# Patient Record
Sex: MALE | Race: WHITE | Employment: OTHER | URBAN - NONMETROPOLITAN AREA
[De-identification: names, ages, dates, MRNs, and addresses within clinical notes are randomized per-mention and may not be internally consistent; named-entity substitution may affect disease eponyms.]

---

## 2018-03-26 ENCOUNTER — NURSE TRIAGE (OUTPATIENT)
Dept: ADMINISTRATIVE | Age: 43
End: 2018-03-26

## 2018-10-12 ENCOUNTER — APPOINTMENT (OUTPATIENT)
Dept: CT IMAGING | Age: 43
DRG: 720 | End: 2018-10-12
Payer: MEDICARE

## 2018-10-12 ENCOUNTER — HOSPITAL ENCOUNTER (INPATIENT)
Age: 43
LOS: 8 days | Discharge: HOME | DRG: 720 | End: 2018-10-20
Attending: INTERNAL MEDICINE
Payer: MEDICARE

## 2018-10-12 ENCOUNTER — APPOINTMENT (OUTPATIENT)
Dept: GENERAL RADIOLOGY | Age: 43
DRG: 720 | End: 2018-10-12
Payer: MEDICARE

## 2018-10-12 DIAGNOSIS — K85.90 ACUTE PANCREATITIS, UNSPECIFIED COMPLICATION STATUS, UNSPECIFIED PANCREATITIS TYPE: Primary | ICD-10-CM

## 2018-10-12 DIAGNOSIS — S09.90XA TRAUMATIC INJURY OF HEAD, INITIAL ENCOUNTER: ICD-10-CM

## 2018-10-12 PROBLEM — S06.0XAA CLOSED HEAD INJURY WITH CONCUSSION: Status: ACTIVE | Noted: 2018-10-12

## 2018-10-12 PROBLEM — S01.01XA LACERATION OF OCCIPITAL SCALP: Status: ACTIVE | Noted: 2018-10-12

## 2018-10-12 PROBLEM — R56.9 OBSERVED SEIZURE-LIKE ACTIVITY (HCC): Status: ACTIVE | Noted: 2018-10-12

## 2018-10-12 PROBLEM — S01.00XA UNSPECIFIED OPEN WOUND OF SCALP, INITIAL ENCOUNTER: Status: ACTIVE | Noted: 2018-10-12

## 2018-10-12 PROBLEM — R41.0 DISORIENTATION: Status: ACTIVE | Noted: 2018-10-12
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
## 2018-10-12 LAB
ALBUMIN SERPL-MCNC: 3.8 G/DL (ref 3.5–5.1)
ALLEN TEST: ABNORMAL
ALLEN TEST: POSITIVE
ALP BLD-CCNC: 71 U/L (ref 38–126)
ALT SERPL-CCNC: 58 U/L (ref 11–66)
AMPHETAMINE+METHAMPHETAMINE URINE SCREEN: NEGATIVE
ANION GAP SERPL CALCULATED.3IONS-SCNC: 18 MEQ/L (ref 8–16)
AST SERPL-CCNC: 69 U/L (ref 5–40)
BACTERIA: ABNORMAL /HPF
BARBITURATE QUANTITATIVE URINE: NEGATIVE
BASE EXCESS (CALCULATED): -18.4 MMOL/L (ref -2.5–2.5)
BASE EXCESS (CALCULATED): -7 MMOL/L (ref -2.5–2.5)
BASOPHILS # BLD: 0.3 %
BASOPHILS ABSOLUTE: 0 THOU/MM3 (ref 0–0.1)
BENZODIAZEPINE QUANTITATIVE URINE: NEGATIVE
BILIRUB SERPL-MCNC: 0.7 MG/DL (ref 0.3–1.2)
BILIRUBIN DIRECT: < 0.2 MG/DL (ref 0–0.3)
BILIRUBIN URINE: NEGATIVE
BLOOD, URINE: ABNORMAL
BUN BLDV-MCNC: 10 MG/DL (ref 7–22)
CALCIUM SERPL-MCNC: 8.2 MG/DL (ref 8.5–10.5)
CANNABINOID QUANTITATIVE URINE: NEGATIVE
CASTS 2: ABNORMAL /LPF
CASTS UA: ABNORMAL /LPF
CHARACTER, URINE: CLEAR
CHLORIDE BLD-SCNC: 99 MEQ/L (ref 98–111)
CO2: 19 MEQ/L (ref 23–33)
COCAINE METABOLITE QUANTITATIVE URINE: NEGATIVE
COLLECTED BY:: ABNORMAL
COLLECTED BY:: ABNORMAL
COLOR: YELLOW
CREAT SERPL-MCNC: 0.7 MG/DL (ref 0.4–1.2)
CRYSTALS, UA: ABNORMAL
DEVICE: ABNORMAL
DEVICE: ABNORMAL
EKG ATRIAL RATE: 142 BPM
EKG P AXIS: 88 DEGREES
EKG P-R INTERVAL: 128 MS
EKG Q-T INTERVAL: 290 MS
EKG QRS DURATION: 100 MS
EKG QTC CALCULATION (BAZETT): 446 MS
EKG R AXIS: 110 DEGREES
EKG T AXIS: 72 DEGREES
EKG VENTRICULAR RATE: 142 BPM
EOSINOPHIL # BLD: 0 %
EOSINOPHILS ABSOLUTE: 0 THOU/MM3 (ref 0–0.4)
EPITHELIAL CELLS, UA: ABNORMAL /HPF
ERYTHROCYTE [DISTWIDTH] IN BLOOD BY AUTOMATED COUNT: 13.6 % (ref 11.5–14.5)
ERYTHROCYTE [DISTWIDTH] IN BLOOD BY AUTOMATED COUNT: 52.7 FL (ref 35–45)
ETHYL ALCOHOL, SERUM: < 0.01 %
GFR SERPL CREATININE-BSD FRML MDRD: > 90 ML/MIN/1.73M2
GLUCOSE BLD-MCNC: 98 MG/DL (ref 70–108)
GLUCOSE URINE: 100 MG/DL
HCO3: 18 MMOL/L (ref 23–28)
HCO3: 9 MMOL/L (ref 23–28)
HCT VFR BLD CALC: 46.6 % (ref 42–52)
HEMOGLOBIN: 15.4 GM/DL (ref 14–18)
IMMATURE GRANS (ABS): 0.15 THOU/MM3 (ref 0–0.07)
IMMATURE GRANULOCYTES: 1 %
INR BLD: 0.84 (ref 0.85–1.13)
KETONES, URINE: NEGATIVE
LEUKOCYTE ESTERASE, URINE: NEGATIVE
LIPASE: > 600 U/L (ref 5.6–51.3)
LYMPHOCYTES # BLD: 13.9 %
LYMPHOCYTES ABSOLUTE: 2 THOU/MM3 (ref 1–4.8)
MCH RBC QN AUTO: 34.3 PG (ref 26–33)
MCHC RBC AUTO-ENTMCNC: 33 GM/DL (ref 32.2–35.5)
MCV RBC AUTO: 103.8 FL (ref 80–94)
MISCELLANEOUS 2: ABNORMAL
MISCELLANEOUS LAB TEST RESULT: ABNORMAL
MONOCYTES # BLD: 8 %
MONOCYTES ABSOLUTE: 1.1 THOU/MM3 (ref 0.4–1.3)
MUCUS: ABNORMAL
NITRITE, URINE: NEGATIVE
NUCLEATED RED BLOOD CELLS: 0 /100 WBC
O2 SATURATION: 80 %
O2 SATURATION: 94 %
OPIATES, URINE: NEGATIVE
OSMOLALITY CALCULATION: 271 MOSMOL/KG (ref 275–300)
OXYCODONE: NEGATIVE
PCO2: 27 MMHG (ref 35–45)
PCO2: 34 MMHG (ref 35–45)
PH BLOOD GAS: 7.14 (ref 7.35–7.45)
PH BLOOD GAS: 7.34 (ref 7.35–7.45)
PH UA: 5.5
PHENCYCLIDINE QUANTITATIVE URINE: NEGATIVE
PLATELET # BLD: 120 THOU/MM3 (ref 130–400)
PMV BLD AUTO: 10.9 FL (ref 9.4–12.4)
PO2: 56 MMHG (ref 71–104)
PO2: 75 MMHG (ref 71–104)
POTASSIUM SERPL-SCNC: 4.1 MEQ/L (ref 3.5–5.2)
PROLACTIN: 14.1 NG/ML
PROTEIN UA: 100
RBC # BLD: 4.49 MILL/MM3 (ref 4.7–6.1)
RBC URINE: ABNORMAL /HPF
RENAL EPITHELIAL, UA: ABNORMAL
SEG NEUTROPHILS: 76.8 %
SEGMENTED NEUTROPHILS ABSOLUTE COUNT: 11 THOU/MM3 (ref 1.8–7.7)
SODIUM BLD-SCNC: 136 MEQ/L (ref 135–145)
SOURCE, BLOOD GAS: ABNORMAL
SOURCE, BLOOD GAS: ABNORMAL
SPECIFIC GRAVITY, URINE: > 1.03 (ref 1–1.03)
TOTAL PROTEIN: 5.8 G/DL (ref 6.1–8)
TROPONIN T: < 0.01 NG/ML
UROBILINOGEN, URINE: 0.2 EU/DL
WBC # BLD: 14.3 THOU/MM3 (ref 4.8–10.8)
WBC UA: ABNORMAL /HPF
YEAST: ABNORMAL

## 2018-10-12 PROCEDURE — 85025 COMPLETE CBC W/AUTO DIFF WBC: CPT

## 2018-10-12 PROCEDURE — 99222 1ST HOSP IP/OBS MODERATE 55: CPT | Performed by: PSYCHIATRY & NEUROLOGY

## 2018-10-12 PROCEDURE — 82803 BLOOD GASES ANY COMBINATION: CPT

## 2018-10-12 PROCEDURE — 6820000002 HC L2 INJURY CALL ACTIVATION

## 2018-10-12 PROCEDURE — 71275 CT ANGIOGRAPHY CHEST: CPT

## 2018-10-12 PROCEDURE — 2709999900 HC NON-CHARGEABLE SUPPLY

## 2018-10-12 PROCEDURE — 3209999900

## 2018-10-12 PROCEDURE — 71045 X-RAY EXAM CHEST 1 VIEW: CPT

## 2018-10-12 PROCEDURE — 2060000000 HC ICU INTERMEDIATE R&B

## 2018-10-12 PROCEDURE — 2580000003 HC RX 258: Performed by: INTERNAL MEDICINE

## 2018-10-12 PROCEDURE — 36415 COLL VENOUS BLD VENIPUNCTURE: CPT

## 2018-10-12 PROCEDURE — 72125 CT NECK SPINE W/O DYE: CPT

## 2018-10-12 PROCEDURE — 99285 EMERGENCY DEPT VISIT HI MDM: CPT

## 2018-10-12 PROCEDURE — 80307 DRUG TEST PRSMV CHEM ANLYZR: CPT

## 2018-10-12 PROCEDURE — 6360000004 HC RX CONTRAST MEDICATION: Performed by: INTERNAL MEDICINE

## 2018-10-12 PROCEDURE — 36600 WITHDRAWAL OF ARTERIAL BLOOD: CPT

## 2018-10-12 PROCEDURE — 74177 CT ABD & PELVIS W/CONTRAST: CPT

## 2018-10-12 PROCEDURE — 70450 CT HEAD/BRAIN W/O DYE: CPT

## 2018-10-12 PROCEDURE — 85610 PROTHROMBIN TIME: CPT

## 2018-10-12 PROCEDURE — 6370000000 HC RX 637 (ALT 250 FOR IP): Performed by: PSYCHIATRY & NEUROLOGY

## 2018-10-12 PROCEDURE — 76376 3D RENDER W/INTRP POSTPROCES: CPT

## 2018-10-12 PROCEDURE — 76705 ECHO EXAM OF ABDOMEN: CPT

## 2018-10-12 PROCEDURE — 87040 BLOOD CULTURE FOR BACTERIA: CPT

## 2018-10-12 PROCEDURE — 80053 COMPREHEN METABOLIC PANEL: CPT

## 2018-10-12 PROCEDURE — 93005 ELECTROCARDIOGRAM TRACING: CPT | Performed by: INTERNAL MEDICINE

## 2018-10-12 PROCEDURE — 84146 ASSAY OF PROLACTIN: CPT

## 2018-10-12 PROCEDURE — 81001 URINALYSIS AUTO W/SCOPE: CPT

## 2018-10-12 PROCEDURE — L0150 CERV SEMI-RIG ADJ MOLDED CHN: HCPCS

## 2018-10-12 PROCEDURE — 99253 IP/OBS CNSLTJ NEW/EST LOW 45: CPT | Performed by: SURGERY

## 2018-10-12 PROCEDURE — 84484 ASSAY OF TROPONIN QUANT: CPT

## 2018-10-12 PROCEDURE — 87086 URINE CULTURE/COLONY COUNT: CPT

## 2018-10-12 PROCEDURE — G0480 DRUG TEST DEF 1-7 CLASSES: HCPCS

## 2018-10-12 PROCEDURE — 82248 BILIRUBIN DIRECT: CPT

## 2018-10-12 PROCEDURE — 6360000002 HC RX W HCPCS: Performed by: PSYCHIATRY & NEUROLOGY

## 2018-10-12 PROCEDURE — APPSS180 APP SPLIT SHARED TIME > 60 MINUTES: Performed by: PHYSICIAN ASSISTANT

## 2018-10-12 PROCEDURE — 83690 ASSAY OF LIPASE: CPT

## 2018-10-12 PROCEDURE — 94760 N-INVAS EAR/PLS OXIMETRY 1: CPT

## 2018-10-12 RX ORDER — ONDANSETRON 2 MG/ML
4 INJECTION INTRAMUSCULAR; INTRAVENOUS EVERY 6 HOURS PRN
Status: DISCONTINUED | OUTPATIENT
Start: 2018-10-12 | End: 2018-10-12 | Stop reason: SDUPTHER

## 2018-10-12 RX ORDER — SODIUM CHLORIDE 0.9 % (FLUSH) 0.9 %
10 SYRINGE (ML) INJECTION PRN
Status: DISCONTINUED | OUTPATIENT
Start: 2018-10-12 | End: 2018-10-12 | Stop reason: SDUPTHER

## 2018-10-12 RX ORDER — POTASSIUM CHLORIDE 7.45 MG/ML
10 INJECTION INTRAVENOUS PRN
Status: DISCONTINUED | OUTPATIENT
Start: 2018-10-12 | End: 2018-10-20 | Stop reason: HOSPADM

## 2018-10-12 RX ORDER — ONDANSETRON 2 MG/ML
4 INJECTION INTRAMUSCULAR; INTRAVENOUS EVERY 6 HOURS PRN
Status: DISCONTINUED | OUTPATIENT
Start: 2018-10-12 | End: 2018-10-20 | Stop reason: HOSPADM

## 2018-10-12 RX ORDER — ACETAMINOPHEN 325 MG/1
650 TABLET ORAL EVERY 4 HOURS PRN
Status: DISCONTINUED | OUTPATIENT
Start: 2018-10-12 | End: 2018-10-20 | Stop reason: HOSPADM

## 2018-10-12 RX ORDER — SODIUM CHLORIDE 0.9 % (FLUSH) 0.9 %
10 SYRINGE (ML) INJECTION EVERY 12 HOURS SCHEDULED
Status: DISCONTINUED | OUTPATIENT
Start: 2018-10-12 | End: 2018-10-20 | Stop reason: HOSPADM

## 2018-10-12 RX ORDER — THIAMINE MONONITRATE (VIT B1) 100 MG
100 TABLET ORAL DAILY
Status: DISCONTINUED | OUTPATIENT
Start: 2018-10-12 | End: 2018-10-14

## 2018-10-12 RX ORDER — LORAZEPAM 2 MG/1
2 TABLET ORAL
Status: DISCONTINUED | OUTPATIENT
Start: 2018-10-12 | End: 2018-10-20 | Stop reason: HOSPADM

## 2018-10-12 RX ORDER — SODIUM CHLORIDE 0.9 % (FLUSH) 0.9 %
10 SYRINGE (ML) INJECTION PRN
Status: DISCONTINUED | OUTPATIENT
Start: 2018-10-12 | End: 2018-10-20 | Stop reason: HOSPADM

## 2018-10-12 RX ORDER — POTASSIUM CHLORIDE 20 MEQ/1
40 TABLET, EXTENDED RELEASE ORAL PRN
Status: DISCONTINUED | OUTPATIENT
Start: 2018-10-12 | End: 2018-10-20 | Stop reason: HOSPADM

## 2018-10-12 RX ORDER — SODIUM CHLORIDE 0.9 % (FLUSH) 0.9 %
10 SYRINGE (ML) INJECTION EVERY 12 HOURS SCHEDULED
Status: DISCONTINUED | OUTPATIENT
Start: 2018-10-12 | End: 2018-10-12 | Stop reason: SDUPTHER

## 2018-10-12 RX ORDER — PANTOPRAZOLE SODIUM 40 MG/1
40 TABLET, DELAYED RELEASE ORAL
Status: DISCONTINUED | OUTPATIENT
Start: 2018-10-13 | End: 2018-10-20 | Stop reason: HOSPADM

## 2018-10-12 RX ORDER — LORAZEPAM 2 MG/1
4 TABLET ORAL
Status: DISCONTINUED | OUTPATIENT
Start: 2018-10-12 | End: 2018-10-20 | Stop reason: HOSPADM

## 2018-10-12 RX ORDER — FOLIC ACID 1 MG/1
1 TABLET ORAL DAILY
Status: DISCONTINUED | OUTPATIENT
Start: 2018-10-12 | End: 2018-10-14

## 2018-10-12 RX ORDER — LORAZEPAM 2 MG/ML
1 INJECTION INTRAMUSCULAR
Status: DISCONTINUED | OUTPATIENT
Start: 2018-10-12 | End: 2018-10-20 | Stop reason: HOSPADM

## 2018-10-12 RX ORDER — LORAZEPAM 2 MG/ML
3 INJECTION INTRAMUSCULAR
Status: DISCONTINUED | OUTPATIENT
Start: 2018-10-12 | End: 2018-10-20 | Stop reason: HOSPADM

## 2018-10-12 RX ORDER — LORAZEPAM 1 MG/1
1 TABLET ORAL
Status: DISCONTINUED | OUTPATIENT
Start: 2018-10-12 | End: 2018-10-20 | Stop reason: HOSPADM

## 2018-10-12 RX ORDER — NICOTINE 21 MG/24HR
1 PATCH, TRANSDERMAL 24 HOURS TRANSDERMAL DAILY
Status: DISCONTINUED | OUTPATIENT
Start: 2018-10-12 | End: 2018-10-20 | Stop reason: HOSPADM

## 2018-10-12 RX ORDER — POTASSIUM CHLORIDE 20MEQ/15ML
40 LIQUID (ML) ORAL PRN
Status: DISCONTINUED | OUTPATIENT
Start: 2018-10-12 | End: 2018-10-20 | Stop reason: HOSPADM

## 2018-10-12 RX ORDER — LORAZEPAM 2 MG/ML
4 INJECTION INTRAMUSCULAR
Status: DISCONTINUED | OUTPATIENT
Start: 2018-10-12 | End: 2018-10-20 | Stop reason: HOSPADM

## 2018-10-12 RX ORDER — LORAZEPAM 2 MG/ML
2 INJECTION INTRAMUSCULAR
Status: DISCONTINUED | OUTPATIENT
Start: 2018-10-12 | End: 2018-10-20 | Stop reason: HOSPADM

## 2018-10-12 RX ORDER — SODIUM CHLORIDE 9 MG/ML
INJECTION, SOLUTION INTRAVENOUS CONTINUOUS
Status: DISCONTINUED | OUTPATIENT
Start: 2018-10-12 | End: 2018-10-13

## 2018-10-12 RX ADMIN — LORAZEPAM 2 MG: 2 INJECTION INTRAMUSCULAR; INTRAVENOUS at 20:46

## 2018-10-12 RX ADMIN — SODIUM CHLORIDE: 9 INJECTION, SOLUTION INTRAVENOUS at 20:45

## 2018-10-12 RX ADMIN — LORAZEPAM 2 MG: 2 INJECTION INTRAMUSCULAR; INTRAVENOUS at 23:20

## 2018-10-12 RX ADMIN — Medication 10 ML: at 22:20

## 2018-10-12 RX ADMIN — SODIUM CHLORIDE 1000 ML: 9 INJECTION, SOLUTION INTRAVENOUS at 13:51

## 2018-10-12 RX ADMIN — IOPAMIDOL 80 ML: 755 INJECTION, SOLUTION INTRAVENOUS at 13:43

## 2018-10-12 RX ADMIN — IOPAMIDOL 80 ML: 755 INJECTION, SOLUTION INTRAVENOUS at 17:11

## 2018-10-12 RX ADMIN — SODIUM CHLORIDE 1000 ML: 9 INJECTION, SOLUTION INTRAVENOUS at 14:36

## 2018-10-12 ASSESSMENT — PAIN SCALES - GENERAL
PAINLEVEL_OUTOF10: 0
PAINLEVEL_OUTOF10: 0

## 2018-10-12 NOTE — CONSULTS
MCV  103.8*   MCH  34.3*   MCHC  33.0     CMP:  Recent Labs      10/12/18   1428   NA  136   K  4.1   CL  99   CO2  19*   BUN  10   CREATININE  0.7   LABGLOM  >90   GLUCOSE  98   CALCIUM  8.2*     Liver: Recent Labs      10/12/18   1428   AST  69*   ALT  58   ALKPHOS  71   PROT  5.8*   LABALBU  3.8   BILITOT  0.7     MRI BRAIN:  No results found for this or any previous visit. No results found for this or any previous visit. No results found for this or any previous visit. No results found for this or any previous visit. No results found for this or any previous visit. No results found for this or any previous visit. No results found for this or any previous visit. Results for orders placed during the hospital encounter of 10/12/18   CT Head WO Contrast    Narrative PROCEDURE: CT HEAD WO CONTRAST    CLINICAL INFORMATION: Headache, trauma. COMPARISON: No prior study. TECHNIQUE: Noncontrast 5 mm axial images were obtained through the brain. All CT scans at this facility use dose modulation, iterative reconstruction, and/or weight-based dosing when appropriate to reduce radiation dose to as low as reasonably achievable. FINDINGS:    No acute intracranial findings. Gray-white differentiation is preserved. No acute hemorrhage or midline shift. Volume loss in the posterior fossa. Ventricles are prominent greater than expected for patient age. Correlation advised. Paranasal sinuses mild mucosal thickening. Mastoid air cells are patent. Skull: Unremarkable. Soft tissues: Posterior scalp hematoma. Impression No acute intracranial findings. **This report has been created using voice recognition software. It may contain minor errors which are inherent in voice recognition technology. **    Final report electronically signed by Dr. Lai Morales on 10/12/2018 2:17 PM         We reviewed the patient records and available information in the EHR       Impression:    Active Problems:    Fall down stairs    Seizure-like activity (HCC)    Disorientation    Unspecified open wound of scalp, initial encounter    Acute pancreatitis without infection or necrosis    Closed head injury with concussion    Acute pancreatitis  Resolved Problems:    * No resolved hospital problems. *    39 year old man with alcohol related seizure disorder due to excessive alcohol consumption. Recommendations:  - will not start seizure med at this time, as seizure med will not work well in such an alcohol abuse setting  - secondly patient does not want to take it and will not lead to compliance  - CIWA protocol, ativan PRN if shows sign of withdrawal  - thiamine and folate  - observe pt for EtOH withdrawal  - no further neurological workup at this time. - patient told not to drive and should not be driving. Mom reported that pt is still driving despite having seizure a few months ago. - Pt should also not climb high, operate heavy machinery and swim. 1. Case was discussed with primary service. 2. All questions were answered. It was my pleasure to evaluate Miguel Puentes today. Please call with questions.       Electronically signed by Shahram Roger MD on 10/12/2018 at 5:42 PM

## 2018-10-12 NOTE — ED PROVIDER NOTES
[facility]  eMERGENCY dEPARTMENT eNCOUnter          CHIEF COMPLAINT     No chief complaint on file. Nurses Notes reviewed and I agree except as noted in the HPI. HISTORY OF PRESENT ILLNESS    Miguel Julien is a 39 y.o. male who presents to the Emergency Department via EMS for the evaluation of a possible seizure. EMS states to have received a call around 1210 today from the patient's sister who stated that the patient had a seizure and needed to be brought to the ED for evaluation. Upon EMS arrival to the patient's residence, they state that the patient was found in the corner of his basement confused and disoriented. Upon arrival to the ED the patient committed a seizure and was transferred to the trauma bay for further evaluation and work up. The HPI was provided by EMS    REVIEW OF SYSTEMS     Review of Systems   Unable to perform ROS: Acuity of condition       PAST MEDICAL HISTORY    has no past medical history on file. SURGICAL HISTORY      has no past surgical history on file. CURRENT MEDICATIONS       There are no discharge medications for this patient. ALLERGIES     has no allergies on file. FAMILY HISTORY     has no family status information on file. family history is not on file. SOCIAL HISTORY          PHYSICAL EXAM     INITIAL VITALS:  temperature is 98.6 °F (37 °C). His blood pressure is 140/86 (abnormal) and his pulse is 105. His respiration is 16 and oxygen saturation is 94%. Physical Exam   Constitutional: He appears well-developed and well-nourished. HENT:   Head: Normocephalic. Right Ear: Tympanic membrane and external ear normal.   Left Ear: Tympanic membrane and external ear normal.   Nose: Nose normal.   Mouth/Throat: Oropharynx is clear and moist and mucous membranes are normal. No oropharyngeal exudate, posterior oropharyngeal edema or posterior oropharyngeal erythema. Puncture wound to the posterior scalp.     Eyes: Conjunctivae and PO2 56 (*)     HCO3 9 (*)     Base Excess (Calculated) -18.4 (*)     All other components within normal limits   HEPATIC FUNCTION PANEL - Abnormal; Notable for the following:     AST 69 (*)     Total Protein 5.8 (*)     All other components within normal limits   BASIC METABOLIC PANEL - Abnormal; Notable for the following:     CO2 19 (*)     Calcium 8.2 (*)     All other components within normal limits   LIPASE - Abnormal; Notable for the following:     Lipase >600.0 (*)     All other components within normal limits   BLOOD GAS, ARTERIAL - Abnormal; Notable for the following:     pH, Blood Gas 7.34 (*)     PCO2 34 (*)     HCO3 18 (*)     Base Excess (Calculated) -7.0 (*)     All other components within normal limits   URINE WITH REFLEXED MICRO - Abnormal; Notable for the following:     Glucose, Ur 100 (*)     Specific Gravity, Urine > 1.030 (*)     Blood, Urine LARGE (*)     Protein,  (*)     All other components within normal limits   ANION GAP - Abnormal; Notable for the following: Anion Gap 18.0 (*)     All other components within normal limits   OSMOLALITY - Abnormal; Notable for the following:     Osmolality Calc 271.0 (*)     All other components within normal limits   CULTURE BLOOD #2   CULTURE BLOOD #1   URINE CULTURE, REFLEXED   TROPONIN   PROLACTIN   ETHANOL   URINE DRUG SCREEN   GLOMERULAR FILTRATION RATE, ESTIMATED       EMERGENCY DEPARTMENT COURSE:   Vitals:    Vitals:    10/12/18 1435 10/12/18 1509 10/12/18 1601 10/12/18 1653   BP: (!) 164/105 (!) 156/95 (!) 139/95 (!) 140/86   Pulse: 113 115 108 105   Resp: 19 18 18 16   Temp:       SpO2: 93% 93% 93% 94%       12:40 PM: The patient was seen and evaluated. 12:47 PM: Upgrade to level 2 trauma due to the patient's presenting condition. 12:55 PM: Trauma MEAGHAN Chavez Child in to evaluate the patient     MDM:  Patient was brought to the emergency department with possible seizure and head trauma.  Patient initial workup was directed towards the for and in the presence of Lauren Wright MD.    Signed by: Casie Piña, 10/12/18 5:30 PM    Provider:  I personally performed the services described in the documentation, reviewed and edited the documentation which was dictated to the scribe in my presence, and it accurately records my words and actions.     Lauren Wright MD 10/12/18 5:30 PM          Lauren Wright MD  10/12/18 [id number]

## 2018-10-13 PROBLEM — E43 SEVERE MALNUTRITION (HCC): Status: ACTIVE | Noted: 2018-10-13

## 2018-10-13 LAB
ANION GAP SERPL CALCULATED.3IONS-SCNC: 14 MEQ/L (ref 8–16)
BUN BLDV-MCNC: 5 MG/DL (ref 7–22)
CALCIUM SERPL-MCNC: 8.1 MG/DL (ref 8.5–10.5)
CHLORIDE BLD-SCNC: 100 MEQ/L (ref 98–111)
CO2: 22 MEQ/L (ref 23–33)
CREAT SERPL-MCNC: 0.3 MG/DL (ref 0.4–1.2)
ERYTHROCYTE [DISTWIDTH] IN BLOOD BY AUTOMATED COUNT: 13.3 % (ref 11.5–14.5)
ERYTHROCYTE [DISTWIDTH] IN BLOOD BY AUTOMATED COUNT: 46.1 FL (ref 35–45)
FOLATE: 7.9 NG/ML (ref 4.8–24.2)
GFR SERPL CREATININE-BSD FRML MDRD: > 90 ML/MIN/1.73M2
GLUCOSE BLD-MCNC: 80 MG/DL (ref 70–108)
HCT VFR BLD CALC: 34.7 % (ref 42–52)
HEMOGLOBIN: 12.5 GM/DL (ref 14–18)
LIPASE: 239.7 U/L (ref 5.6–51.3)
MAGNESIUM: 1.8 MG/DL (ref 1.6–2.4)
MCH RBC QN AUTO: 34.2 PG (ref 26–33)
MCHC RBC AUTO-ENTMCNC: 36 GM/DL (ref 32.2–35.5)
MCV RBC AUTO: 94.8 FL (ref 80–94)
ORGANISM: ABNORMAL
PLATELET # BLD: 80 THOU/MM3 (ref 130–400)
PMV BLD AUTO: 10.8 FL (ref 9.4–12.4)
POTASSIUM REFLEX MAGNESIUM: 2.8 MEQ/L (ref 3.5–5.2)
POTASSIUM SERPL-SCNC: 2.9 MEQ/L (ref 3.5–5.2)
POTASSIUM SERPL-SCNC: 3.5 MEQ/L (ref 3.5–5.2)
RBC # BLD: 3.66 MILL/MM3 (ref 4.7–6.1)
SODIUM BLD-SCNC: 136 MEQ/L (ref 135–145)
URINE CULTURE REFLEX: ABNORMAL
VITAMIN B-12: 330 PG/ML (ref 211–911)
WBC # BLD: 10.6 THOU/MM3 (ref 4.8–10.8)

## 2018-10-13 PROCEDURE — 83690 ASSAY OF LIPASE: CPT

## 2018-10-13 PROCEDURE — 85027 COMPLETE CBC AUTOMATED: CPT

## 2018-10-13 PROCEDURE — 2060000000 HC ICU INTERMEDIATE R&B

## 2018-10-13 PROCEDURE — 80048 BASIC METABOLIC PNL TOTAL CA: CPT

## 2018-10-13 PROCEDURE — G8978 MOBILITY CURRENT STATUS: HCPCS

## 2018-10-13 PROCEDURE — 83735 ASSAY OF MAGNESIUM: CPT

## 2018-10-13 PROCEDURE — 97530 THERAPEUTIC ACTIVITIES: CPT

## 2018-10-13 PROCEDURE — 2709999900 HC NON-CHARGEABLE SUPPLY

## 2018-10-13 PROCEDURE — 99233 SBSQ HOSP IP/OBS HIGH 50: CPT | Performed by: INTERNAL MEDICINE

## 2018-10-13 PROCEDURE — 97161 PT EVAL LOW COMPLEX 20 MIN: CPT

## 2018-10-13 PROCEDURE — 6370000000 HC RX 637 (ALT 250 FOR IP): Performed by: PSYCHIATRY & NEUROLOGY

## 2018-10-13 PROCEDURE — 2580000003 HC RX 258: Performed by: INTERNAL MEDICINE

## 2018-10-13 PROCEDURE — 6360000002 HC RX W HCPCS: Performed by: INTERNAL MEDICINE

## 2018-10-13 PROCEDURE — APPSS30 APP SPLIT SHARED TIME 16-30 MINUTES: Performed by: PHYSICIAN ASSISTANT

## 2018-10-13 PROCEDURE — 82607 VITAMIN B-12: CPT

## 2018-10-13 PROCEDURE — 84132 ASSAY OF SERUM POTASSIUM: CPT

## 2018-10-13 PROCEDURE — 36415 COLL VENOUS BLD VENIPUNCTURE: CPT

## 2018-10-13 PROCEDURE — 6370000000 HC RX 637 (ALT 250 FOR IP): Performed by: INTERNAL MEDICINE

## 2018-10-13 PROCEDURE — 93010 ELECTROCARDIOGRAM REPORT: CPT | Performed by: INTERNAL MEDICINE

## 2018-10-13 PROCEDURE — 6360000002 HC RX W HCPCS: Performed by: PSYCHIATRY & NEUROLOGY

## 2018-10-13 PROCEDURE — G8979 MOBILITY GOAL STATUS: HCPCS

## 2018-10-13 PROCEDURE — 82746 ASSAY OF FOLIC ACID SERUM: CPT

## 2018-10-13 RX ORDER — SODIUM CHLORIDE, SODIUM LACTATE, POTASSIUM CHLORIDE, CALCIUM CHLORIDE 600; 310; 30; 20 MG/100ML; MG/100ML; MG/100ML; MG/100ML
INJECTION, SOLUTION INTRAVENOUS CONTINUOUS
Status: DISCONTINUED | OUTPATIENT
Start: 2018-10-13 | End: 2018-10-14

## 2018-10-13 RX ORDER — POTASSIUM CHLORIDE 7.45 MG/ML
10 INJECTION INTRAVENOUS
Status: COMPLETED | OUTPATIENT
Start: 2018-10-13 | End: 2018-10-13

## 2018-10-13 RX ORDER — POTASSIUM CHLORIDE 20MEQ/15ML
40 LIQUID (ML) ORAL ONCE
Status: DISCONTINUED | OUTPATIENT
Start: 2018-10-13 | End: 2018-10-13

## 2018-10-13 RX ORDER — POTASSIUM CHLORIDE 7.45 MG/ML
10 INJECTION INTRAVENOUS
Status: DISCONTINUED | OUTPATIENT
Start: 2018-10-13 | End: 2018-10-13 | Stop reason: SDUPTHER

## 2018-10-13 RX ADMIN — POTASSIUM CHLORIDE 10 MEQ: 7.46 INJECTION, SOLUTION INTRAVENOUS at 10:04

## 2018-10-13 RX ADMIN — LORAZEPAM 2 MG: 2 INJECTION INTRAMUSCULAR; INTRAVENOUS at 16:32

## 2018-10-13 RX ADMIN — POTASSIUM CHLORIDE 10 MEQ: 7.46 INJECTION, SOLUTION INTRAVENOUS at 13:09

## 2018-10-13 RX ADMIN — LORAZEPAM 2 MG: 2 INJECTION INTRAMUSCULAR; INTRAVENOUS at 21:04

## 2018-10-13 RX ADMIN — SODIUM CHLORIDE, POTASSIUM CHLORIDE, SODIUM LACTATE AND CALCIUM CHLORIDE: 600; 310; 30; 20 INJECTION, SOLUTION INTRAVENOUS at 21:04

## 2018-10-13 RX ADMIN — POTASSIUM CHLORIDE 10 MEQ: 7.46 INJECTION, SOLUTION INTRAVENOUS at 17:57

## 2018-10-13 RX ADMIN — LORAZEPAM 4 MG: 2 INJECTION INTRAMUSCULAR; INTRAVENOUS at 12:55

## 2018-10-13 RX ADMIN — POTASSIUM CHLORIDE 10 MEQ: 7.46 INJECTION, SOLUTION INTRAVENOUS at 16:34

## 2018-10-13 RX ADMIN — POTASSIUM BICARBONATE 40 MEQ: 782 TABLET, EFFERVESCENT ORAL at 23:48

## 2018-10-13 RX ADMIN — LORAZEPAM 4 MG: 2 INJECTION INTRAMUSCULAR; INTRAVENOUS at 09:55

## 2018-10-13 RX ADMIN — POTASSIUM CHLORIDE 10 MEQ: 7.46 INJECTION, SOLUTION INTRAVENOUS at 07:00

## 2018-10-13 RX ADMIN — LORAZEPAM 4 MG: 2 INJECTION INTRAMUSCULAR; INTRAVENOUS at 07:44

## 2018-10-13 RX ADMIN — POTASSIUM CHLORIDE 10 MEQ: 7.46 INJECTION, SOLUTION INTRAVENOUS at 07:48

## 2018-10-13 RX ADMIN — Medication 10 ML: at 21:04

## 2018-10-13 RX ADMIN — LORAZEPAM 2 MG: 2 INJECTION INTRAMUSCULAR; INTRAVENOUS at 05:05

## 2018-10-13 RX ADMIN — LORAZEPAM 2 MG: 2 INJECTION INTRAMUSCULAR; INTRAVENOUS at 06:50

## 2018-10-13 RX ADMIN — SODIUM CHLORIDE: 9 INJECTION, SOLUTION INTRAVENOUS at 01:09

## 2018-10-13 ASSESSMENT — PAIN SCALES - GENERAL
PAINLEVEL_OUTOF10: 0
PAINLEVEL_OUTOF10: 0

## 2018-10-13 NOTE — PROGRESS NOTES
Pio Mccarty seeing for Dr. Maximus Ferrara  Daily Progress Note  Pt Name: Ana Maria Paz Record Number: [id number]  Date of Birth 1975   Today's Date: 10/13/2018    HD: # 1      CC: \"I feel fine,  ASSESSMENT  1. Active Hospital Problems    Diagnosis Date Noted    Fall down stairs [W10.8XXA] 10/12/2018    Disorientation [R41.0] 10/12/2018    Unspecified open wound of scalp, initial encounter [S01.00XA] 10/12/2018    Acute pancreatitis without infection or necrosis [K85.90] 10/12/2018    Seizure-like activity ([facility] [address] [R56.9] 10/12/2018    Closed head injury with concussion [S06.0X9A] 10/12/2018    Acute pancreatitis [K85.90]     Seizure ([address] [R56.9]     Alcohol abuse [F10.10]          PLAN    CHI w/ concussion               -Limited brain stimulation guidelines              -Cognitive evaluation per SLP                -Pain control              -Routine NV checks      Seizure-like activity             -Neuro consult per medicine               -secondary to ETOH intoxication/dependence    -No prophylaxis recommended   -CIWA protocol, Ativan PRN              -Monitor               -Routine NV checks    ETOH abuse/withdrawal   -No e/o DTs   -IVF hydration   -CIWA protocol, Ativan PRN   -supportive care     Scalp wound              -Routine wound care      Pancreatitis              -Medicine managing    -IV hydration              -Repeat labs as appropriate    -Lipase 239 this am, from >600 yesterday      Disposition discharge planning pending clinical course, per Medicine. Trauma will sign-off at this point as Pt is stable per trauma and no further services to be offered. SUBJECTIVE  Pt continues on 4A this am. He is pleasant and cooperative on exam, in NAD. Pr more alert this am, but still not oriented to time. This may be cognitive baseline for Pt, pending cog eval at this time.  Per nursing and mother present in room, Pt has chronic

## 2018-10-13 NOTE — H&P
History & Physical        Patient:  Kodak Oliveira  YOB: 1975    MRN: [id number]     Acct: [de-identified]    PCP: No primary care provider on file. Date of Admission: 10/12/2018    Date of Service: Pt seen/examined on 10/12/18  [facility] greater than two midnights due to medical therapy. Chief Complaint:  Pancreatitis      History Of Present Illness:    39 y.o. male who presented to [facility] with h/o EtOH abuse, HTN, and seizure admitted for pancreatitis. He is a poor historian and does not remember much of the event. He was found by his sister in the basement with foaming at the mouth. He is a heavy drinker. Past Medical History:          Diagnosis Date    Alcohol abuse     Hypertension     Seizures ([facility] [address]        Past Surgical History:      No past surgical history on file. Medications Prior to Admission:      Prior to Admission medications    Not on File       Allergies:  Patient has no known allergies. Social History:      The patient currently lives by himself. TOBACCO:   reports that he has been smoking. He has never used smokeless tobacco.  ETOH:   has no alcohol history on file. Family History:    Reviewed in detail and negative for DM, CAD, Cancer, CVA. Positive as follows:    Family History   Problem Relation Age of Onset    Coronary Art Dis Mother     High Blood Pressure Mother    [de-identified] / Stillbirths Father     Alcohol Abuse Brother     Heart Disease Brother     Heart Attack Brother        Diet:  Diet NPO Effective Now Exceptions are: Ice Chips    REVIEW OF SYSTEMS:   Pertinent positives as noted in the HPI. All other systems reviewed and negative.     PHYSICAL EXAM:    BP (!) 140/79   Pulse 98   Temp 99 °F (37.2 °C) (Oral)   Resp 19   Ht 5' 11\" (1.803 m)   Wt 125 lb 11.2 oz (57 kg)   SpO2 96%   BMI 17.53 kg/m²     General appearance:  No apparent distress, appears stated age and
results found for this visit on 10/12/18. Physical Exam:  Patient Vitals for the past 24 hrs:   Temp Pulse Resp SpO2   10/12/18 1252 - 143 16 95 %   10/12/18 1249 98.6 °F (37 °C) 149 17 94 %     Primary Assessment:  Airway: Patent, trachea midline  Breathing: Breath sounds present and equal bilaterally, spontaneous, and unlabored  Circulation: Hemodynamically stable, 2+ central and peripheral pulses. Disability: MANUEL x 4, not following commands. GCS =8    Secondary Assessment:  General: Thin appearing male, mildly agitated. Head: Small left-sided hematoma noted, posterior occipital scalp with small overlying wound, non bleeding. Normocephalic, mid face stable, Tympanic membranes intact, Nares patent bilaterally, no epistaxis. Mouth clear of foreign bodies, no lacerations or abrasions. Eyes: Pupils dilated, sluggish bilaterally. Nontraumatic   Neurologic: Awake, Unresponsive. Not following commands initially. CN 2-12 grossly intact            Alert and oriented x 2, not oriented to time on re-evaluation  Neck: Immobilized in cervical collar, trachea midline. Cervical spines NTTP midline, without step-offs, crepitus or deformity. Back:TL spines are NTTP midline, without step-offs, crepitus or deformity. No abrasions, contusions, or ecchymosis noted. Lungs: Clear to auscultation bilaterally. Chest Wall: Chest rise symmetrical.  Chest wall without tenderness to palpation. No crepitus, deformities, lacerations, or abrasions. Heart: RRR. Normal S1/S2. No obvious M/G/R. Abdomen:  Soft, NTTP. No guarding. Non-peritoneal.  Pelvis:  NTTP, stable to compression. Femoral pulses 2+. GI/: No blood at the urinary meatus. No gross hematuria. Extremities: No gross deformities. PMS intact. Radial /DP/PT pulses 2+ bilaterally. Skin: Scattered superficial abrasions. Skin warm and dry. Normal for ethnicity.       Radiology:     CTA Chest W WO Contrast   Final Result   No acute pulmonary arterial embolism

## 2018-10-13 NOTE — PROGRESS NOTES
limitations of exam.   No lobar consolidation. No acute fracture thoracic spine. **This report has been created using voice recognition software. It may contain minor errors which are inherent in voice recognition technology. **      Final report electronically signed by Dr. Hailey Kwon on 10/12/2018 2:37 PM      CT THORACIC RECONSTRUCTION WO POST PROCESS   Final Result   No acute pulmonary arterial embolism within limitations of exam.   No lobar consolidation. No acute fracture thoracic spine. **This report has been created using voice recognition software. It may contain minor errors which are inherent in voice recognition technology. **      Final report electronically signed by Dr. Hailey Kwon on 10/12/2018 2:37 PM      CT Head WO Contrast   Final Result   No acute intracranial findings. **This report has been created using voice recognition software. It may contain minor errors which are inherent in voice recognition technology. **      Final report electronically signed by Dr. Hailey Kwon on 10/12/2018 2:17 PM      CT Cervical Spine WO Contrast   Final Result    No acute fracture or subluxation. **This report has been created using voice recognition software. It may contain minor errors which are inherent in voice recognition technology. **      Final report electronically signed by Dr. Hailey Kwon on 10/12/2018 2:15 PM      XR CHEST PORTABLE   Final Result   No acute findings               **This report has been created using voice recognition software. It may contain minor errors which are inherent in voice recognition technology. **      Final report electronically signed by Dr. Hailey Kwon on 10/12/2018 2:08 PM          Diet: Diet NPO Effective Now Exceptions are: Ice Chips    DVT prophylaxis: [x] Lovenox                                 [] SCDs                                 [] SQ Heparin                                 [] Encourage ambulation           []

## 2018-10-14 LAB
ALBUMIN SERPL-MCNC: 3.6 G/DL (ref 3.5–5.1)
ALP BLD-CCNC: 62 U/L (ref 38–126)
ALT SERPL-CCNC: 61 U/L (ref 11–66)
AMMONIA: 39 UMOL/L (ref 11–60)
ANION GAP SERPL CALCULATED.3IONS-SCNC: 18 MEQ/L (ref 8–16)
AST SERPL-CCNC: 207 U/L (ref 5–40)
BILIRUB SERPL-MCNC: 1.2 MG/DL (ref 0.3–1.2)
BILIRUBIN DIRECT: 0.3 MG/DL (ref 0–0.3)
BUN BLDV-MCNC: 6 MG/DL (ref 7–22)
CALCIUM SERPL-MCNC: 8.6 MG/DL (ref 8.5–10.5)
CHLORIDE BLD-SCNC: 98 MEQ/L (ref 98–111)
CO2: 20 MEQ/L (ref 23–33)
CREAT SERPL-MCNC: 0.4 MG/DL (ref 0.4–1.2)
ERYTHROCYTE [DISTWIDTH] IN BLOOD BY AUTOMATED COUNT: 13 % (ref 11.5–14.5)
ERYTHROCYTE [DISTWIDTH] IN BLOOD BY AUTOMATED COUNT: 45.2 FL (ref 35–45)
GFR SERPL CREATININE-BSD FRML MDRD: > 90 ML/MIN/1.73M2
GLUCOSE BLD-MCNC: 60 MG/DL (ref 70–108)
HCT VFR BLD CALC: 39.8 % (ref 42–52)
HEMOGLOBIN: 14.2 GM/DL (ref 14–18)
INR BLD: 0.94 (ref 0.85–1.13)
MAGNESIUM: 1.4 MG/DL (ref 1.6–2.4)
MCH RBC QN AUTO: 34.1 PG (ref 26–33)
MCHC RBC AUTO-ENTMCNC: 35.7 GM/DL (ref 32.2–35.5)
MCV RBC AUTO: 95.4 FL (ref 80–94)
PHOSPHORUS: 2.3 MG/DL (ref 2.4–4.7)
PLATELET # BLD: 75 THOU/MM3 (ref 130–400)
PMV BLD AUTO: 11.2 FL (ref 9.4–12.4)
POTASSIUM SERPL-SCNC: 3.3 MEQ/L (ref 3.5–5.2)
RBC # BLD: 4.17 MILL/MM3 (ref 4.7–6.1)
SODIUM BLD-SCNC: 136 MEQ/L (ref 135–145)
TOTAL CK: ABNORMAL U/L (ref 55–170)
TOTAL CK: ABNORMAL U/L (ref 55–170)
TOTAL PROTEIN: 5.7 G/DL (ref 6.1–8)
WBC # BLD: 11.9 THOU/MM3 (ref 4.8–10.8)

## 2018-10-14 PROCEDURE — 82140 ASSAY OF AMMONIA: CPT

## 2018-10-14 PROCEDURE — 82248 BILIRUBIN DIRECT: CPT

## 2018-10-14 PROCEDURE — 6370000000 HC RX 637 (ALT 250 FOR IP): Performed by: PSYCHIATRY & NEUROLOGY

## 2018-10-14 PROCEDURE — 84100 ASSAY OF PHOSPHORUS: CPT

## 2018-10-14 PROCEDURE — 6370000000 HC RX 637 (ALT 250 FOR IP): Performed by: HOSPITALIST

## 2018-10-14 PROCEDURE — 2580000003 HC RX 258: Performed by: INTERNAL MEDICINE

## 2018-10-14 PROCEDURE — 83735 ASSAY OF MAGNESIUM: CPT

## 2018-10-14 PROCEDURE — 2709999900 HC NON-CHARGEABLE SUPPLY

## 2018-10-14 PROCEDURE — 85610 PROTHROMBIN TIME: CPT

## 2018-10-14 PROCEDURE — 85027 COMPLETE CBC AUTOMATED: CPT

## 2018-10-14 PROCEDURE — 6370000000 HC RX 637 (ALT 250 FOR IP): Performed by: INTERNAL MEDICINE

## 2018-10-14 PROCEDURE — 6360000002 HC RX W HCPCS: Performed by: PSYCHIATRY & NEUROLOGY

## 2018-10-14 PROCEDURE — 2500000003 HC RX 250 WO HCPCS: Performed by: INTERNAL MEDICINE

## 2018-10-14 PROCEDURE — 99233 SBSQ HOSP IP/OBS HIGH 50: CPT | Performed by: INTERNAL MEDICINE

## 2018-10-14 PROCEDURE — 6360000002 HC RX W HCPCS: Performed by: INTERNAL MEDICINE

## 2018-10-14 PROCEDURE — 36415 COLL VENOUS BLD VENIPUNCTURE: CPT

## 2018-10-14 PROCEDURE — 82550 ASSAY OF CK (CPK): CPT

## 2018-10-14 PROCEDURE — 80053 COMPREHEN METABOLIC PANEL: CPT

## 2018-10-14 PROCEDURE — 2060000000 HC ICU INTERMEDIATE R&B

## 2018-10-14 RX ORDER — SODIUM CHLORIDE, SODIUM LACTATE, POTASSIUM CHLORIDE, CALCIUM CHLORIDE 600; 310; 30; 20 MG/100ML; MG/100ML; MG/100ML; MG/100ML
INJECTION, SOLUTION INTRAVENOUS CONTINUOUS
Status: DISCONTINUED | OUTPATIENT
Start: 2018-10-14 | End: 2018-10-18

## 2018-10-14 RX ORDER — MAGNESIUM SULFATE IN WATER 40 MG/ML
4 INJECTION, SOLUTION INTRAVENOUS ONCE
Status: COMPLETED | OUTPATIENT
Start: 2018-10-14 | End: 2018-10-14

## 2018-10-14 RX ORDER — MULTIVITAMIN WITH FOLIC ACID 400 MCG
1 TABLET ORAL DAILY
Status: DISCONTINUED | OUTPATIENT
Start: 2018-10-14 | End: 2018-10-14

## 2018-10-14 RX ORDER — LANOLIN ALCOHOL/MO/W.PET/CERES
1000 CREAM (GRAM) TOPICAL DAILY
Status: DISCONTINUED | OUTPATIENT
Start: 2018-10-14 | End: 2018-10-20 | Stop reason: HOSPADM

## 2018-10-14 RX ADMIN — LORAZEPAM 4 MG: 2 INJECTION INTRAMUSCULAR; INTRAVENOUS at 18:52

## 2018-10-14 RX ADMIN — PANTOPRAZOLE SODIUM 40 MG: 40 TABLET, DELAYED RELEASE ORAL at 06:39

## 2018-10-14 RX ADMIN — LORAZEPAM 4 MG: 2 INJECTION INTRAMUSCULAR; INTRAVENOUS at 20:47

## 2018-10-14 RX ADMIN — LORAZEPAM 4 MG: 2 INJECTION INTRAMUSCULAR; INTRAVENOUS at 22:07

## 2018-10-14 RX ADMIN — LORAZEPAM 4 MG: 2 INJECTION INTRAMUSCULAR; INTRAVENOUS at 23:21

## 2018-10-14 RX ADMIN — POTASSIUM BICARBONATE 40 MEQ: 782 TABLET, EFFERVESCENT ORAL at 08:10

## 2018-10-14 RX ADMIN — LORAZEPAM 4 MG: 2 INJECTION INTRAMUSCULAR; INTRAVENOUS at 14:42

## 2018-10-14 RX ADMIN — LORAZEPAM 4 MG: 2 INJECTION INTRAMUSCULAR; INTRAVENOUS at 07:29

## 2018-10-14 RX ADMIN — FOLIC ACID: 5 INJECTION, SOLUTION INTRAMUSCULAR; INTRAVENOUS; SUBCUTANEOUS at 12:48

## 2018-10-14 RX ADMIN — LORAZEPAM 4 MG: 2 INJECTION INTRAMUSCULAR; INTRAVENOUS at 06:40

## 2018-10-14 RX ADMIN — LORAZEPAM 2 MG: 2 INJECTION INTRAMUSCULAR; INTRAVENOUS at 02:05

## 2018-10-14 RX ADMIN — Medication 100 MG: at 08:10

## 2018-10-14 RX ADMIN — FOLIC ACID 1 MG: 1 TABLET ORAL at 08:10

## 2018-10-14 RX ADMIN — LORAZEPAM 4 MG: 2 INJECTION INTRAMUSCULAR; INTRAVENOUS at 04:06

## 2018-10-14 RX ADMIN — LORAZEPAM 4 MG: 2 INJECTION INTRAMUSCULAR; INTRAVENOUS at 11:29

## 2018-10-14 RX ADMIN — LORAZEPAM 4 MG: 2 INJECTION INTRAMUSCULAR; INTRAVENOUS at 12:43

## 2018-10-14 RX ADMIN — MAGNESIUM SULFATE IN WATER 4 G: 40 INJECTION, SOLUTION INTRAVENOUS at 14:43

## 2018-10-14 RX ADMIN — SODIUM CHLORIDE, POTASSIUM CHLORIDE, SODIUM LACTATE AND CALCIUM CHLORIDE: 600; 310; 30; 20 INJECTION, SOLUTION INTRAVENOUS at 23:21

## 2018-10-14 RX ADMIN — LORAZEPAM 4 MG: 2 INJECTION INTRAMUSCULAR; INTRAVENOUS at 10:45

## 2018-10-14 RX ADMIN — SODIUM CHLORIDE, POTASSIUM CHLORIDE, SODIUM LACTATE AND CALCIUM CHLORIDE: 600; 310; 30; 20 INJECTION, SOLUTION INTRAVENOUS at 06:46

## 2018-10-14 ASSESSMENT — PAIN SCALES - GENERAL: PAINLEVEL_OUTOF10: 0

## 2018-10-14 NOTE — PROGRESS NOTES
software. It may contain minor errors which are inherent in voice recognition technology. **      Final report electronically signed by Dr. Yana Muller on 10/12/2018 6:09 PM      CTA Chest W WO Contrast   Final Result   No acute pulmonary arterial embolism within limitations of exam.   No lobar consolidation. No acute fracture thoracic spine. **This report has been created using voice recognition software. It may contain minor errors which are inherent in voice recognition technology. **      Final report electronically signed by Dr. Yana Muller on 10/12/2018 2:37 PM      CT THORACIC RECONSTRUCTION WO POST PROCESS   Final Result   No acute pulmonary arterial embolism within limitations of exam.   No lobar consolidation. No acute fracture thoracic spine. **This report has been created using voice recognition software. It may contain minor errors which are inherent in voice recognition technology. **      Final report electronically signed by Dr. Yana Muller on 10/12/2018 2:37 PM      CT Head WO Contrast   Final Result   No acute intracranial findings. **This report has been created using voice recognition software. It may contain minor errors which are inherent in voice recognition technology. **      Final report electronically signed by Dr. Yana Muller on 10/12/2018 2:17 PM      CT Cervical Spine WO Contrast   Final Result    No acute fracture or subluxation. **This report has been created using voice recognition software. It may contain minor errors which are inherent in voice recognition technology. **      Final report electronically signed by Dr. Yana Muller on 10/12/2018 2:15 PM      XR CHEST PORTABLE   Final Result   No acute findings               **This report has been created using voice recognition software. It may contain minor errors which are inherent in voice recognition technology. **      Final report electronically signed by Dr. Yana Muller on 10/12/2018 K    Macrocytosis: Check vitamin B12/folate with history of EtOH use disorder.  - B12 on low end. Will replace    Thrombocytopenia: 120k to 80k. Monitor with CBC. Possibly reactive to acute stress? [place] No heparin products given. Fatty Liver: Likely due to EtOH use. Will assess for evidence of hepatic dysfunction. He has to stop drinking or will very likely develop cirrhosis. Rhabdomyolysis: Likely due to recent seizure. Possible due to DT's.   - LR @ 150 cc/hr after banana bag  - trend CK  - possibly consult neph if worsening  - monitor renal function        Electronically signed by Jesus Elmore MD on 10/14/2018 at 8:49 AM

## 2018-10-15 LAB
ANION GAP SERPL CALCULATED.3IONS-SCNC: 17 MEQ/L (ref 8–16)
ANION GAP SERPL CALCULATED.3IONS-SCNC: 19 MEQ/L (ref 8–16)
BUN BLDV-MCNC: 3 MG/DL (ref 7–22)
BUN BLDV-MCNC: 3 MG/DL (ref 7–22)
CALCIUM SERPL-MCNC: 8.1 MG/DL (ref 8.5–10.5)
CALCIUM SERPL-MCNC: 8.4 MG/DL (ref 8.5–10.5)
CHLORIDE BLD-SCNC: 100 MEQ/L (ref 98–111)
CHLORIDE BLD-SCNC: 95 MEQ/L (ref 98–111)
CO2: 22 MEQ/L (ref 23–33)
CO2: 22 MEQ/L (ref 23–33)
CREAT SERPL-MCNC: 0.3 MG/DL (ref 0.4–1.2)
CREAT SERPL-MCNC: 0.3 MG/DL (ref 0.4–1.2)
ERYTHROCYTE [DISTWIDTH] IN BLOOD BY AUTOMATED COUNT: 12.6 % (ref 11.5–14.5)
ERYTHROCYTE [DISTWIDTH] IN BLOOD BY AUTOMATED COUNT: 43.2 FL (ref 35–45)
GFR SERPL CREATININE-BSD FRML MDRD: > 90 ML/MIN/1.73M2
GFR SERPL CREATININE-BSD FRML MDRD: > 90 ML/MIN/1.73M2
GLUCOSE BLD-MCNC: 106 MG/DL (ref 70–108)
GLUCOSE BLD-MCNC: 74 MG/DL (ref 70–108)
HCT VFR BLD CALC: 35.9 % (ref 42–52)
HEMOGLOBIN: 13 GM/DL (ref 14–18)
MAGNESIUM: 1.5 MG/DL (ref 1.6–2.4)
MAGNESIUM: 2.6 MG/DL (ref 1.6–2.4)
MCH RBC QN AUTO: 34 PG (ref 26–33)
MCHC RBC AUTO-ENTMCNC: 36.2 GM/DL (ref 32.2–35.5)
MCV RBC AUTO: 94 FL (ref 80–94)
PHOSPHORUS: 2.4 MG/DL (ref 2.4–4.7)
PHOSPHORUS: 2.5 MG/DL (ref 2.4–4.7)
PLATELET # BLD: 92 THOU/MM3 (ref 130–400)
PMV BLD AUTO: 11.1 FL (ref 9.4–12.4)
POTASSIUM SERPL-SCNC: 3.1 MEQ/L (ref 3.5–5.2)
POTASSIUM SERPL-SCNC: 3.2 MEQ/L (ref 3.5–5.2)
RBC # BLD: 3.82 MILL/MM3 (ref 4.7–6.1)
SODIUM BLD-SCNC: 136 MEQ/L (ref 135–145)
SODIUM BLD-SCNC: 139 MEQ/L (ref 135–145)
TOTAL CK: ABNORMAL U/L (ref 55–170)
TOTAL CK: ABNORMAL U/L (ref 55–170)
WBC # BLD: 8.7 THOU/MM3 (ref 4.8–10.8)

## 2018-10-15 PROCEDURE — 84100 ASSAY OF PHOSPHORUS: CPT

## 2018-10-15 PROCEDURE — 2580000003 HC RX 258: Performed by: INTERNAL MEDICINE

## 2018-10-15 PROCEDURE — 2060000000 HC ICU INTERMEDIATE R&B

## 2018-10-15 PROCEDURE — G8988 SELF CARE GOAL STATUS: HCPCS

## 2018-10-15 PROCEDURE — 6370000000 HC RX 637 (ALT 250 FOR IP): Performed by: HOSPITALIST

## 2018-10-15 PROCEDURE — 85027 COMPLETE CBC AUTOMATED: CPT

## 2018-10-15 PROCEDURE — 97165 OT EVAL LOW COMPLEX 30 MIN: CPT

## 2018-10-15 PROCEDURE — 2709999900 HC NON-CHARGEABLE SUPPLY

## 2018-10-15 PROCEDURE — 6360000002 HC RX W HCPCS: Performed by: INTERNAL MEDICINE

## 2018-10-15 PROCEDURE — 80048 BASIC METABOLIC PNL TOTAL CA: CPT

## 2018-10-15 PROCEDURE — 83735 ASSAY OF MAGNESIUM: CPT

## 2018-10-15 PROCEDURE — 99254 IP/OBS CNSLTJ NEW/EST MOD 60: CPT | Performed by: INTERNAL MEDICINE

## 2018-10-15 PROCEDURE — 6370000000 HC RX 637 (ALT 250 FOR IP): Performed by: INTERNAL MEDICINE

## 2018-10-15 PROCEDURE — 36415 COLL VENOUS BLD VENIPUNCTURE: CPT

## 2018-10-15 PROCEDURE — 6370000000 HC RX 637 (ALT 250 FOR IP): Performed by: PSYCHIATRY & NEUROLOGY

## 2018-10-15 PROCEDURE — 6360000002 HC RX W HCPCS: Performed by: PSYCHIATRY & NEUROLOGY

## 2018-10-15 PROCEDURE — 82550 ASSAY OF CK (CPK): CPT

## 2018-10-15 PROCEDURE — 6360000002 HC RX W HCPCS: Performed by: FAMILY MEDICINE

## 2018-10-15 PROCEDURE — 99233 SBSQ HOSP IP/OBS HIGH 50: CPT | Performed by: INTERNAL MEDICINE

## 2018-10-15 PROCEDURE — 97110 THERAPEUTIC EXERCISES: CPT

## 2018-10-15 PROCEDURE — 97530 THERAPEUTIC ACTIVITIES: CPT

## 2018-10-15 PROCEDURE — G8987 SELF CARE CURRENT STATUS: HCPCS

## 2018-10-15 RX ORDER — POTASSIUM CHLORIDE 7.45 MG/ML
10 INJECTION INTRAVENOUS ONCE
Status: COMPLETED | OUTPATIENT
Start: 2018-10-15 | End: 2018-10-15

## 2018-10-15 RX ORDER — THIAMINE MONONITRATE (VIT B1) 100 MG
100 TABLET ORAL DAILY
Status: DISCONTINUED | OUTPATIENT
Start: 2018-10-15 | End: 2018-10-20 | Stop reason: HOSPADM

## 2018-10-15 RX ORDER — POTASSIUM CHLORIDE 20 MEQ/1
40 TABLET, EXTENDED RELEASE ORAL ONCE
Status: DISCONTINUED | OUTPATIENT
Start: 2018-10-15 | End: 2018-10-15

## 2018-10-15 RX ORDER — MAGNESIUM SULFATE IN WATER 40 MG/ML
4 INJECTION, SOLUTION INTRAVENOUS ONCE
Status: COMPLETED | OUTPATIENT
Start: 2018-10-15 | End: 2018-10-15

## 2018-10-15 RX ORDER — FOLIC ACID 1 MG/1
1 TABLET ORAL DAILY
Status: DISCONTINUED | OUTPATIENT
Start: 2018-10-15 | End: 2018-10-20 | Stop reason: HOSPADM

## 2018-10-15 RX ORDER — POTASSIUM CHLORIDE 7.45 MG/ML
10 INJECTION INTRAVENOUS ONCE
Status: DISCONTINUED | OUTPATIENT
Start: 2018-10-16 | End: 2018-10-15

## 2018-10-15 RX ADMIN — LORAZEPAM 4 MG: 2 INJECTION INTRAMUSCULAR; INTRAVENOUS at 16:49

## 2018-10-15 RX ADMIN — LORAZEPAM 4 MG: 2 INJECTION INTRAMUSCULAR; INTRAVENOUS at 03:08

## 2018-10-15 RX ADMIN — PANTOPRAZOLE SODIUM 40 MG: 40 TABLET, DELAYED RELEASE ORAL at 05:56

## 2018-10-15 RX ADMIN — SODIUM CHLORIDE, POTASSIUM CHLORIDE, SODIUM LACTATE AND CALCIUM CHLORIDE: 600; 310; 30; 20 INJECTION, SOLUTION INTRAVENOUS at 06:34

## 2018-10-15 RX ADMIN — LORAZEPAM 4 MG: 2 INJECTION INTRAMUSCULAR; INTRAVENOUS at 09:46

## 2018-10-15 RX ADMIN — LORAZEPAM 4 MG: 2 INJECTION INTRAMUSCULAR; INTRAVENOUS at 01:40

## 2018-10-15 RX ADMIN — LORAZEPAM 4 MG: 2 INJECTION INTRAMUSCULAR; INTRAVENOUS at 13:46

## 2018-10-15 RX ADMIN — LORAZEPAM 4 MG: 2 INJECTION INTRAMUSCULAR; INTRAVENOUS at 19:01

## 2018-10-15 RX ADMIN — SODIUM CHLORIDE, POTASSIUM CHLORIDE, SODIUM LACTATE AND CALCIUM CHLORIDE: 600; 310; 30; 20 INJECTION, SOLUTION INTRAVENOUS at 12:18

## 2018-10-15 RX ADMIN — SODIUM CHLORIDE, POTASSIUM CHLORIDE, SODIUM LACTATE AND CALCIUM CHLORIDE: 600; 310; 30; 20 INJECTION, SOLUTION INTRAVENOUS at 22:55

## 2018-10-15 RX ADMIN — LORAZEPAM 4 MG: 2 INJECTION INTRAMUSCULAR; INTRAVENOUS at 22:27

## 2018-10-15 RX ADMIN — LORAZEPAM 4 MG: 2 INJECTION INTRAMUSCULAR; INTRAVENOUS at 00:23

## 2018-10-15 RX ADMIN — LORAZEPAM 4 MG: 2 INJECTION INTRAMUSCULAR; INTRAVENOUS at 05:51

## 2018-10-15 RX ADMIN — POTASSIUM CHLORIDE 10 MEQ: 7.46 INJECTION, SOLUTION INTRAVENOUS at 23:35

## 2018-10-15 RX ADMIN — POTASSIUM CHLORIDE 10 MEQ: 7.46 INJECTION, SOLUTION INTRAVENOUS at 23:32

## 2018-10-15 RX ADMIN — MAGNESIUM SULFATE IN WATER 4 G: 40 INJECTION, SOLUTION INTRAVENOUS at 12:19

## 2018-10-15 RX ADMIN — LORAZEPAM 4 MG: 2 INJECTION INTRAMUSCULAR; INTRAVENOUS at 11:15

## 2018-10-15 RX ADMIN — ACETAMINOPHEN 650 MG: 325 TABLET ORAL at 05:50

## 2018-10-15 RX ADMIN — POTASSIUM CHLORIDE 10 MEQ: 7.46 INJECTION, SOLUTION INTRAVENOUS at 22:23

## 2018-10-15 RX ADMIN — Medication 100 MG: at 08:43

## 2018-10-15 RX ADMIN — Medication 1000 MCG: at 08:40

## 2018-10-15 RX ADMIN — LORAZEPAM 4 MG: 2 INJECTION INTRAMUSCULAR; INTRAVENOUS at 08:38

## 2018-10-15 RX ADMIN — Medication 10 ML: at 22:24

## 2018-10-15 RX ADMIN — POTASSIUM BICARBONATE 40 MEQ: 782 TABLET, EFFERVESCENT ORAL at 08:38

## 2018-10-15 RX ADMIN — FOLIC ACID 1 MG: 1 TABLET ORAL at 08:43

## 2018-10-15 ASSESSMENT — PAIN SCALES - GENERAL
PAINLEVEL_OUTOF10: 0
PAINLEVEL_OUTOF10: 2
PAINLEVEL_OUTOF10: 2

## 2018-10-15 ASSESSMENT — PAIN SCALES - WONG BAKER: WONGBAKER_NUMERICALRESPONSE: 2

## 2018-10-15 NOTE — PROGRESS NOTES
[facility] THERAPY MISSED TREATMENT NOTE  [facility] NEUROSCIENCES 4A      Date: 10/15/2018  Patient Name: Frida Bradford        MRN: [id number]    : 1975  (39 y.o.)    REASON FOR MISSED TREATMENT:    Pt currently with nursing needs/lunch tray. Plan to complete cognitive eval on 10/16.     Faby Diehl M.S. -[id number]\

## 2018-10-15 NOTE — PROGRESS NOTES
incomprehensible words. As patient has evidence for pancreatitis and likely concussion, trauma will remain on as a consult service     Past Medical History:   Diagnosis Date    Alcohol abuse     Hypertension     Seizures ([address]      No past surgical history on file. Restrictions/Precautions:  General Precautions, Fall Risk, Seizure                    Other position/activity restrictions: ETOH hx        Prior Level of Function:  ADL Assistance: Independent  Homemaking Assistance: Independent  Ambulation Assistance: Independent  Transfer Assistance: Independent  Additional Comments: Mother reports that Pt did not have a physical limitations PLOF. Subjective:     Subjective: RN approved session. pt in bed upon arrival with mom and sitter present. disoriented to situation, oriented to name and birthday. pt not making sense, having trouble following commands, guarded, resistive, attempting to participate however. , fidgeting thgouhout session    Pain:  Denies. Social/Functional:  Lives With: Alone  Type of Home: Apartment  Home Layout: One level  Home Access: Stairs to enter without rails (1 flight)     Objective:  Supine to Sit: Maximum assistance (guarded, resistive, not following cues, attempted with +1, completed with +2)  Sit to Supine: Maximum assistance (of 2, pt resisting/difficulty following cues, guarded)  Scooting: Dependent/Total;Maximal assistance (max/dep of 2 to reposition in bed and boost up toward [facility])    Transfers  Sit to Stand: Unable to assess (unsafe this date)    Balance  Comments: seated EOB ~2-3mins. pt leaning heavily posterior and to R, mod/max A of 1 and CGA of another to maintain balance.  cues for midline, pt fidgeting throughout, guarded, kept eyes closed    Exercises:  Exercises  Comments: supine AAROM to improve mobiltiy and strength x10 reps ankle pumps, heel slides, hip ab/add, SLR. having difficulty following commands, max cues and assist throughout        Activity

## 2018-10-15 NOTE — PROGRESS NOTES
[address]  INPATIENT OCCUPATIONAL THERAPY  [facility] NEUROSCIENCES 4A  EVALUATION    Time:  Time In: 998  Time Out: 0751  Timed Code Treatment Minutes: 0 Minutes  Minutes: 8          Date: 10/15/2018  Patient Name: Zenobia Rice,   Gender: male      MRN: [id number]  : 1975  (39 y.o.)  Referring Practitioner: Dr. Stevie French  Diagnosis: acute pancreatitis  Additional Pertinent Hx: 39year old man with strong hx of EtOH abuse, and had hx of seizure related to it. Had a seizure today while climbing a stair, patient came out of it, he remembers he fell down. Patient stated that he drank 2 days but mother confirmed that he just drank with a strong alochol at his bedside. Mom stated that he drank way too much and had seizure before. CT imaging of the head and neck were negative for any acute process. CT thoracic spine negative for any acute fracture, subluxation. CTA chest was also ordered per ED provider to rule out PE. This was also negative. Upon returning from CT imaging patient became more alert and was able to verbalize complaints, follow commands. He initially stated that his back was hurting, but then retracted this statement saying that nothing was bothering him. Upon further questioning patient denied any use of alcohol, or any additional substances. He did assert that he believes he had a seizure, and cannot recall the events directly preceding the seizure or after. No prior hx of seizures. He did not endorse falling or knowledge of a fall. Per law enforcement who entered room upon secondary evaluation, it was reported Pt fell down ~11 steps into the basement where he was found by sister. All imaging and lab work unremarkable for any acute traumatic process. Urine tox negative, as well as serum ETOH. Incidentally, pt found to have elevated Lipase and AST.  Pt's level of alertness was improved from trauma standpoint, but Pt did become intermittently confused and utter incomprehensible presentation, and decision making during this evaluation, this patient is of low complexity.     OT G-codes  Functional Limitation: Self care  Self Care Current Status (): 100 percent impaired, limited or restricted  Self Care Goal Status (): 100 percent impaired, limited or restricted  AM-PAC Inpatient Daily Activity Raw Score: 6  AM-PAC Inpatient ADL T-Scale Score : 17.07  ADL Inpatient CMS 0-100% Score: 100  ADL Inpatient CMS G-Code Modifier : CN

## 2018-10-15 NOTE — PROGRESS NOTES
Hospitalist Progress Note    Patient:  Jamee Peoples      Unit/Bed:4A-05/005-A    YOB: 1975    MRN: [id number]       Acct: [de-identified]     PCP: No primary care provider on file. Date of Admission: 10/12/2018    Chief Complaint: Found down by sister    Hospital Course: admitted for likely seizure and alcohol withdrawal    Subjective: Less active in the bed today. Responds but very clearly confused/hallucinating. Ck >20k      Medications:  Reviewed    Infusion Medications    lactated ringers 150 mL/hr at 10/15/18 3597     Scheduled Medications    potassium bicarb-citric acid  40 mEq Oral Once    thiamine  100 mg Oral Daily    folic acid  1 mg Oral Daily    vitamin B-12  1,000 mcg Oral Daily    sodium chloride flush  10 mL Intravenous 2 times per day    nicotine  1 patch Transdermal Daily    pantoprazole  40 mg Oral QAM AC     PRN Meds: sodium chloride flush, acetaminophen, ondansetron, potassium chloride **OR** potassium chloride **OR** potassium chloride, LORazepam **OR** LORazepam **OR** LORazepam **OR** LORazepam **OR** LORazepam **OR** LORazepam **OR** LORazepam **OR** LORazepam      Intake/Output Summary (Last 24 hours) at 10/15/18 0757  Last data filed at 10/15/18 0336   Gross per 24 hour   Intake          4975.06 ml   Output             1400 ml   Net          3575.06 ml       Diet:  DIET FULL LIQUID;    Exam:  BP (!) 148/95   Pulse 76   Temp 98.3 °F (36.8 °C) (Oral)   Resp 16   Ht 5' 11\" (1.803 m)   Wt 124 lb 8 oz (56.5 kg)   SpO2 99%   BMI 17.36 kg/m²     General appearance: moving all over bed, but doesn't seem to be agitated. Conversational, doesn't make sense. More restful today  HEENT: Pupils equal, round, and reactive to light. Conjunctivae/corneas clear. Neck: Supple, with full range of motion. No jugular venous distention. Trachea midline. Respiratory:  Normal respiratory effort.  Clear to auscultation, bilaterally without

## 2018-10-16 LAB
ANION GAP SERPL CALCULATED.3IONS-SCNC: 16 MEQ/L (ref 8–16)
BUN BLDV-MCNC: < 2 MG/DL (ref 7–22)
CALCIUM SERPL-MCNC: 8.2 MG/DL (ref 8.5–10.5)
CHLORIDE BLD-SCNC: 100 MEQ/L (ref 98–111)
CK BY DILUTION: ABNORMAL U/L (ref 55–170)
CO2: 22 MEQ/L (ref 23–33)
CREAT SERPL-MCNC: 0.3 MG/DL (ref 0.4–1.2)
ERYTHROCYTE [DISTWIDTH] IN BLOOD BY AUTOMATED COUNT: 12.6 % (ref 11.5–14.5)
ERYTHROCYTE [DISTWIDTH] IN BLOOD BY AUTOMATED COUNT: 45 FL (ref 35–45)
GFR SERPL CREATININE-BSD FRML MDRD: > 90 ML/MIN/1.73M2
GLUCOSE BLD-MCNC: 94 MG/DL (ref 70–108)
HCT VFR BLD CALC: 38.8 % (ref 42–52)
HEMOGLOBIN: 13.4 GM/DL (ref 14–18)
MAGNESIUM: 1.8 MG/DL (ref 1.6–2.4)
MCH RBC QN AUTO: 33.6 PG (ref 26–33)
MCHC RBC AUTO-ENTMCNC: 34.5 GM/DL (ref 32.2–35.5)
MCV RBC AUTO: 97.2 FL (ref 80–94)
PLATELET # BLD: 129 THOU/MM3 (ref 130–400)
PMV BLD AUTO: 9.7 FL (ref 9.4–12.4)
POTASSIUM SERPL-SCNC: 3.2 MEQ/L (ref 3.5–5.2)
POTASSIUM SERPL-SCNC: 3.7 MEQ/L (ref 3.5–5.2)
RBC # BLD: 3.99 MILL/MM3 (ref 4.7–6.1)
SODIUM BLD-SCNC: 138 MEQ/L (ref 135–145)
TOTAL CK: ABNORMAL U/L (ref 55–170)
TOTAL CK: ABNORMAL U/L (ref 55–170)
WBC # BLD: 7.1 THOU/MM3 (ref 4.8–10.8)

## 2018-10-16 PROCEDURE — 2580000003 HC RX 258: Performed by: INTERNAL MEDICINE

## 2018-10-16 PROCEDURE — 85027 COMPLETE CBC AUTOMATED: CPT

## 2018-10-16 PROCEDURE — 2709999900 HC NON-CHARGEABLE SUPPLY

## 2018-10-16 PROCEDURE — 99233 SBSQ HOSP IP/OBS HIGH 50: CPT | Performed by: FAMILY MEDICINE

## 2018-10-16 PROCEDURE — 6360000002 HC RX W HCPCS: Performed by: PSYCHIATRY & NEUROLOGY

## 2018-10-16 PROCEDURE — 6370000000 HC RX 637 (ALT 250 FOR IP): Performed by: FAMILY MEDICINE

## 2018-10-16 PROCEDURE — 99232 SBSQ HOSP IP/OBS MODERATE 35: CPT | Performed by: INTERNAL MEDICINE

## 2018-10-16 PROCEDURE — 2060000000 HC ICU INTERMEDIATE R&B

## 2018-10-16 PROCEDURE — 36415 COLL VENOUS BLD VENIPUNCTURE: CPT

## 2018-10-16 PROCEDURE — 84132 ASSAY OF SERUM POTASSIUM: CPT

## 2018-10-16 PROCEDURE — APPSS30 APP SPLIT SHARED TIME 16-30 MINUTES: Performed by: NURSE PRACTITIONER

## 2018-10-16 PROCEDURE — 83735 ASSAY OF MAGNESIUM: CPT

## 2018-10-16 PROCEDURE — 82550 ASSAY OF CK (CPK): CPT

## 2018-10-16 PROCEDURE — 6360000002 HC RX W HCPCS: Performed by: INTERNAL MEDICINE

## 2018-10-16 PROCEDURE — 80048 BASIC METABOLIC PNL TOTAL CA: CPT

## 2018-10-16 RX ORDER — ACETAMINOPHEN 650 MG/1
650 SUPPOSITORY RECTAL EVERY 4 HOURS PRN
Status: DISCONTINUED | OUTPATIENT
Start: 2018-10-16 | End: 2018-10-20 | Stop reason: HOSPADM

## 2018-10-16 RX ORDER — POTASSIUM CHLORIDE 7.45 MG/ML
10 INJECTION INTRAVENOUS
Status: COMPLETED | OUTPATIENT
Start: 2018-10-16 | End: 2018-10-16

## 2018-10-16 RX ORDER — HYDRALAZINE HYDROCHLORIDE 20 MG/ML
5 INJECTION INTRAMUSCULAR; INTRAVENOUS EVERY 6 HOURS PRN
Status: DISCONTINUED | OUTPATIENT
Start: 2018-10-16 | End: 2018-10-20 | Stop reason: HOSPADM

## 2018-10-16 RX ADMIN — SODIUM CHLORIDE, POTASSIUM CHLORIDE, SODIUM LACTATE AND CALCIUM CHLORIDE: 600; 310; 30; 20 INJECTION, SOLUTION INTRAVENOUS at 10:46

## 2018-10-16 RX ADMIN — SODIUM CHLORIDE, POTASSIUM CHLORIDE, SODIUM LACTATE AND CALCIUM CHLORIDE: 600; 310; 30; 20 INJECTION, SOLUTION INTRAVENOUS at 04:16

## 2018-10-16 RX ADMIN — SODIUM CHLORIDE, POTASSIUM CHLORIDE, SODIUM LACTATE AND CALCIUM CHLORIDE: 600; 310; 30; 20 INJECTION, SOLUTION INTRAVENOUS at 21:20

## 2018-10-16 RX ADMIN — LORAZEPAM 4 MG: 2 INJECTION INTRAMUSCULAR; INTRAVENOUS at 05:55

## 2018-10-16 RX ADMIN — SODIUM CHLORIDE, POTASSIUM CHLORIDE, SODIUM LACTATE AND CALCIUM CHLORIDE: 600; 310; 30; 20 INJECTION, SOLUTION INTRAVENOUS at 16:14

## 2018-10-16 RX ADMIN — ACETAMINOPHEN 650 MG: 650 SUPPOSITORY RECTAL at 10:40

## 2018-10-16 RX ADMIN — SODIUM CHLORIDE, POTASSIUM CHLORIDE, SODIUM LACTATE AND CALCIUM CHLORIDE: 600; 310; 30; 20 INJECTION, SOLUTION INTRAVENOUS at 15:40

## 2018-10-16 RX ADMIN — POTASSIUM CHLORIDE 10 MEQ: 7.46 INJECTION, SOLUTION INTRAVENOUS at 10:53

## 2018-10-16 RX ADMIN — LORAZEPAM 4 MG: 2 INJECTION INTRAMUSCULAR; INTRAVENOUS at 02:07

## 2018-10-16 RX ADMIN — POTASSIUM CHLORIDE 10 MEQ: 7.46 INJECTION, SOLUTION INTRAVENOUS at 12:00

## 2018-10-16 RX ADMIN — POTASSIUM CHLORIDE 10 MEQ: 7.46 INJECTION, SOLUTION INTRAVENOUS at 15:40

## 2018-10-16 RX ADMIN — ACETAMINOPHEN 650 MG: 650 SUPPOSITORY RECTAL at 21:29

## 2018-10-16 RX ADMIN — Medication 10 ML: at 10:50

## 2018-10-16 RX ADMIN — LORAZEPAM 4 MG: 2 INJECTION INTRAMUSCULAR; INTRAVENOUS at 04:16

## 2018-10-16 RX ADMIN — POTASSIUM CHLORIDE 10 MEQ: 7.46 INJECTION, SOLUTION INTRAVENOUS at 13:04

## 2018-10-16 RX ADMIN — LORAZEPAM 3 MG: 2 INJECTION INTRAMUSCULAR; INTRAVENOUS at 13:06

## 2018-10-16 ASSESSMENT — PAIN SCALES - GENERAL
PAINLEVEL_OUTOF10: 0

## 2018-10-16 ASSESSMENT — PAIN SCALES - WONG BAKER: WONGBAKER_NUMERICALRESPONSE: 0

## 2018-10-16 NOTE — PROGRESS NOTES
WBCUA 10-15 10/12/2018    BACTERIA NONE 10/12/2018    RBCUA 15-25 10/12/2018    BLOODU LARGE 10/12/2018    GLUCOSEU 100 10/12/2018       Radiology:  CT ABDOMEN PELVIS W IV CONTRAST Additional Contrast? Radiologist Recommendation   Final Result      1. There is nonspecific small amount of abdominal and pelvic ascites. Correlation with symptoms is advised. 2. There is apparent thickening of the sigmoid colon and rectum. Findings may be related to underdistention, Underlying colitis is not excluded. **This report has been created using voice recognition software. It may contain minor errors which are inherent in voice recognition technology. **      Final report electronically signed by Dr. Tomy Collins on 10/12/2018 6:09 PM      CTA Chest W WO Contrast   Final Result   No acute pulmonary arterial embolism within limitations of exam.   No lobar consolidation. No acute fracture thoracic spine. **This report has been created using voice recognition software. It may contain minor errors which are inherent in voice recognition technology. **      Final report electronically signed by Dr. Tomy Collins on 10/12/2018 2:37 PM      CT THORACIC RECONSTRUCTION WO POST PROCESS   Final Result   No acute pulmonary arterial embolism within limitations of exam.   No lobar consolidation. No acute fracture thoracic spine. **This report has been created using voice recognition software. It may contain minor errors which are inherent in voice recognition technology. **      Final report electronically signed by Dr. Tomy Collins on 10/12/2018 2:37 PM      CT Head WO Contrast   Final Result   No acute intracranial findings. **This report has been created using voice recognition software. It may contain minor errors which are inherent in voice recognition technology. **      Final report electronically signed by Dr. Tomy Collins on 10/12/2018 2:17 PM      CT Cervical Spine WO Contrast   Final Result No acute fracture or subluxation. **This report has been created using voice recognition software. It may contain minor errors which are inherent in voice recognition technology. **      Final report electronically signed by Dr. Jamie Downs on 10/12/2018 2:15 PM      XR CHEST PORTABLE   Final Result   No acute findings               **This report has been created using voice recognition software. It may contain minor errors which are inherent in voice recognition technology. **      Final report electronically signed by Dr. Jamie Downs on 10/12/2018 2:08 PM          Diet: DIET FULL LIQUID;  Dietary Nutrition Supplements: Standard High Calorie Oral Supplement    DVT prophylaxis: [x] Lovenox                                 [] SCDs                                 [] SQ Heparin                                 [] Encourage ambulation           [] Already on Anticoagulation     Disposition:    [x] Home       [] TCU       [] Rehab       [] Psych       [] SNF       [] [facility]       [] Other-    Code Status: Full Code    PT/OT Eval Status: pending, will start once fully awake    Assessment/Plan:    Anticipated Discharge in : 2-3 days    Active Hospital Problems    Diagnosis Date Noted    Elevated CK [R74.8]     Hypokalemia [E87.6]     Hypomagnesemia [E83.42]     Severe malnutrition ([address] [E43] 10/13/2018     Class: Acute    Alcohol withdrawal syndrome, with delirium ([address] [F10.231]     Fall down stairs Pool Thompson 10/12/2018    Disorientation [R41.0] 10/12/2018    Unspecified open wound of scalp, initial encounter [S01.00XA] 10/12/2018    Acute pancreatitis without infection or necrosis [K85.90] 10/12/2018    Seizure-like activity ([address] [R56.9] 10/12/2018    Closed head injury with concussion [S06.0X9A] 10/12/2018    Acute pancreatitis [K85.90]     Seizure ([address] [R56.9]     Alcohol abuse [F10.10]        Severe Acute EtOH Withdrawal/Delirium Tremens  Patient is not oriented,

## 2018-10-17 LAB
ANION GAP SERPL CALCULATED.3IONS-SCNC: 12 MEQ/L (ref 8–16)
BUN BLDV-MCNC: 6 MG/DL (ref 7–22)
CALCIUM IONIZED: 1.14 MMOL/L (ref 1.12–1.32)
CALCIUM SERPL-MCNC: 8 MG/DL (ref 8.5–10.5)
CHLORIDE BLD-SCNC: 98 MEQ/L (ref 98–111)
CO2: 25 MEQ/L (ref 23–33)
CREAT SERPL-MCNC: 0.5 MG/DL (ref 0.4–1.2)
ERYTHROCYTE [DISTWIDTH] IN BLOOD BY AUTOMATED COUNT: 12.8 % (ref 11.5–14.5)
ERYTHROCYTE [DISTWIDTH] IN BLOOD BY AUTOMATED COUNT: 44.5 FL (ref 35–45)
GFR SERPL CREATININE-BSD FRML MDRD: > 90 ML/MIN/1.73M2
GLUCOSE BLD-MCNC: 96 MG/DL (ref 70–108)
GLUCOSE BLD-MCNC: 99 MG/DL (ref 70–108)
HCT VFR BLD CALC: 36.1 % (ref 42–52)
HEMOGLOBIN: 12.7 GM/DL (ref 14–18)
LACTIC ACID: 2.8 MMOL/L (ref 0.5–2.2)
MAGNESIUM: 1.4 MG/DL (ref 1.6–2.4)
MCH RBC QN AUTO: 33.7 PG (ref 26–33)
MCHC RBC AUTO-ENTMCNC: 35.2 GM/DL (ref 32.2–35.5)
MCV RBC AUTO: 95.8 FL (ref 80–94)
PHOSPHORUS: 3.4 MG/DL (ref 2.4–4.7)
PLATELET # BLD: 128 THOU/MM3 (ref 130–400)
PMV BLD AUTO: 10.5 FL (ref 9.4–12.4)
POTASSIUM SERPL-SCNC: 3.8 MEQ/L (ref 3.5–5.2)
RBC # BLD: 3.77 MILL/MM3 (ref 4.7–6.1)
SODIUM BLD-SCNC: 135 MEQ/L (ref 135–145)
TOTAL CK: 7636 U/L (ref 55–170)
TOTAL CK: 8451 U/L (ref 55–170)
TROPONIN T: < 0.01 NG/ML
WBC # BLD: 10.5 THOU/MM3 (ref 4.8–10.8)

## 2018-10-17 PROCEDURE — 2709999900 HC NON-CHARGEABLE SUPPLY

## 2018-10-17 PROCEDURE — 97110 THERAPEUTIC EXERCISES: CPT

## 2018-10-17 PROCEDURE — 99232 SBSQ HOSP IP/OBS MODERATE 35: CPT | Performed by: INTERNAL MEDICINE

## 2018-10-17 PROCEDURE — 82948 REAGENT STRIP/BLOOD GLUCOSE: CPT

## 2018-10-17 PROCEDURE — 87086 URINE CULTURE/COLONY COUNT: CPT

## 2018-10-17 PROCEDURE — 99232 SBSQ HOSP IP/OBS MODERATE 35: CPT | Performed by: FAMILY MEDICINE

## 2018-10-17 PROCEDURE — 83735 ASSAY OF MAGNESIUM: CPT

## 2018-10-17 PROCEDURE — 80048 BASIC METABOLIC PNL TOTAL CA: CPT

## 2018-10-17 PROCEDURE — 2580000003 HC RX 258: Performed by: INTERNAL MEDICINE

## 2018-10-17 PROCEDURE — 82330 ASSAY OF CALCIUM: CPT

## 2018-10-17 PROCEDURE — 84145 PROCALCITONIN (PCT): CPT

## 2018-10-17 PROCEDURE — 83605 ASSAY OF LACTIC ACID: CPT

## 2018-10-17 PROCEDURE — 92523 SPEECH SOUND LANG COMPREHEN: CPT

## 2018-10-17 PROCEDURE — 6370000000 HC RX 637 (ALT 250 FOR IP): Performed by: HOSPITALIST

## 2018-10-17 PROCEDURE — 82550 ASSAY OF CK (CPK): CPT

## 2018-10-17 PROCEDURE — APPSS30 APP SPLIT SHARED TIME 16-30 MINUTES: Performed by: NURSE PRACTITIONER

## 2018-10-17 PROCEDURE — 6360000002 HC RX W HCPCS: Performed by: PSYCHIATRY & NEUROLOGY

## 2018-10-17 PROCEDURE — 93005 ELECTROCARDIOGRAM TRACING: CPT | Performed by: FAMILY MEDICINE

## 2018-10-17 PROCEDURE — 6370000000 HC RX 637 (ALT 250 FOR IP): Performed by: INTERNAL MEDICINE

## 2018-10-17 PROCEDURE — 97530 THERAPEUTIC ACTIVITIES: CPT

## 2018-10-17 PROCEDURE — 36415 COLL VENOUS BLD VENIPUNCTURE: CPT

## 2018-10-17 PROCEDURE — 84100 ASSAY OF PHOSPHORUS: CPT

## 2018-10-17 PROCEDURE — 85027 COMPLETE CBC AUTOMATED: CPT

## 2018-10-17 PROCEDURE — 2060000000 HC ICU INTERMEDIATE R&B

## 2018-10-17 PROCEDURE — 6360000002 HC RX W HCPCS: Performed by: NURSE PRACTITIONER

## 2018-10-17 PROCEDURE — 6370000000 HC RX 637 (ALT 250 FOR IP): Performed by: FAMILY MEDICINE

## 2018-10-17 PROCEDURE — 6370000000 HC RX 637 (ALT 250 FOR IP): Performed by: NURSE PRACTITIONER

## 2018-10-17 PROCEDURE — 84484 ASSAY OF TROPONIN QUANT: CPT

## 2018-10-17 PROCEDURE — 6370000000 HC RX 637 (ALT 250 FOR IP): Performed by: PSYCHIATRY & NEUROLOGY

## 2018-10-17 RX ORDER — MAGNESIUM SULFATE IN WATER 40 MG/ML
2 INJECTION, SOLUTION INTRAVENOUS ONCE
Status: COMPLETED | OUTPATIENT
Start: 2018-10-17 | End: 2018-10-17

## 2018-10-17 RX ORDER — POTASSIUM CHLORIDE 20 MEQ/1
20 TABLET, EXTENDED RELEASE ORAL ONCE
Status: COMPLETED | OUTPATIENT
Start: 2018-10-17 | End: 2018-10-17

## 2018-10-17 RX ORDER — SODIUM CHLORIDE, SODIUM LACTATE, POTASSIUM CHLORIDE, AND CALCIUM CHLORIDE .6; .31; .03; .02 G/100ML; G/100ML; G/100ML; G/100ML
500 INJECTION, SOLUTION INTRAVENOUS ONCE
Status: COMPLETED | OUTPATIENT
Start: 2018-10-18 | End: 2018-10-18

## 2018-10-17 RX ADMIN — ACETAMINOPHEN 650 MG: 650 SUPPOSITORY RECTAL at 02:58

## 2018-10-17 RX ADMIN — MAGNESIUM SULFATE HEPTAHYDRATE 2 G: 40 INJECTION, SOLUTION INTRAVENOUS at 10:24

## 2018-10-17 RX ADMIN — PANTOPRAZOLE SODIUM 40 MG: 40 TABLET, DELAYED RELEASE ORAL at 09:29

## 2018-10-17 RX ADMIN — Medication 10 ML: at 19:50

## 2018-10-17 RX ADMIN — POTASSIUM CHLORIDE 20 MEQ: 20 TABLET, EXTENDED RELEASE ORAL at 09:30

## 2018-10-17 RX ADMIN — LORAZEPAM 2 MG: 2 INJECTION INTRAMUSCULAR; INTRAVENOUS at 19:50

## 2018-10-17 RX ADMIN — FOLIC ACID 1 MG: 1 TABLET ORAL at 09:30

## 2018-10-17 RX ADMIN — Medication 1000 MCG: at 09:29

## 2018-10-17 RX ADMIN — ACETAMINOPHEN 650 MG: 650 SUPPOSITORY RECTAL at 23:59

## 2018-10-17 RX ADMIN — LORAZEPAM 4 MG: 2 INJECTION INTRAMUSCULAR; INTRAVENOUS at 20:58

## 2018-10-17 RX ADMIN — Medication 100 MG: at 09:30

## 2018-10-17 ASSESSMENT — PAIN SCALES - GENERAL
PAINLEVEL_OUTOF10: 0

## 2018-10-17 ASSESSMENT — ENCOUNTER SYMPTOMS
ABDOMINAL PAIN: 0
DOUBLE VISION: 0
BACK PAIN: 0
WHEEZING: 0
COUGH: 1
VOMITING: 0
NAUSEA: 0
DIARRHEA: 0
BLURRED VISION: 0
SHORTNESS OF BREATH: 0
ORTHOPNEA: 0
EYE PAIN: 0
HEMOPTYSIS: 0
PHOTOPHOBIA: 0

## 2018-10-17 NOTE — PROGRESS NOTES
with CGA x 1 with RW for household ambulation. Short term goal 4: Pt to tolerate 15 reps B LE ther ex to improve strength for functional mobility              Montyjohnathan Telles PTA 0170      PT saw pt with PTA for gait assessment this date.     Willian Olivia, PT, DPT         AM-PAC Inpatient Mobility without Stair Climbing Raw Score : 15  AM-PAC Inpatient without Stair Climbing T-Scale Score : 43.03  Mobility Inpatient CMS 0-100% Score: 47.43  Mobility Inpatient without Stair CMS G-Code Modifier : CK

## 2018-10-17 NOTE — PROGRESS NOTES
easily. Psychiatric/Behavioral: Positive for hallucinations. Negative for depression and suicidal ideas. The patient is not nervous/anxious. Medications:  Reviewed    Infusion Medications    lactated ringers 200 mL/hr at 10/16/18 2120     Scheduled Medications    thiamine  100 mg Oral Daily    folic acid  1 mg Oral Daily    vitamin B-12  1,000 mcg Oral Daily    sodium chloride flush  10 mL Intravenous 2 times per day    nicotine  1 patch Transdermal Daily    pantoprazole  40 mg Oral QAM AC     PRN Meds: hydrALAZINE, acetaminophen, sodium chloride flush, acetaminophen, ondansetron, potassium chloride **OR** potassium chloride **OR** potassium chloride, LORazepam **OR** LORazepam **OR** LORazepam **OR** LORazepam **OR** LORazepam **OR** LORazepam **OR** LORazepam **OR** LORazepam      Intake/Output Summary (Last 24 hours) at 10/17/18 1807  Last data filed at 10/17/18 1348   Gross per 24 hour   Intake          4447.64 ml   Output              475 ml   Net          3972.64 ml       Diet:  DIET FULL LIQUID;  Dietary Nutrition Supplements: Standard High Calorie Oral Supplement    Exam:  /70   Pulse 76   Temp 99.1 °F (37.3 °C) (Oral)   Resp 18   Ht 5' 11\" (1.803 m)   Wt 132 lb 8 oz (60.1 kg)   SpO2 95%   BMI 18.48 kg/m²     General appearance: No apparent distress, appears stated age and cooperative. HEENT: Pupils equal, round, and reactive to light. Conjunctivae/corneas clear. Neck: Supple, with full range of motion. No jugular venous distention. Trachea midline. Respiratory:  Normal respiratory effort. Clear to auscultation, bilaterally without Rales/Wheezes/Rhonchi. Cardiovascular: Regular rate and rhythm with normal S1/S2 without murmurs, rubs or gallops. Abdomen: Soft, non-tender, non-distended with normal bowel sounds. Musculoskeletal: passive and active ROM x 4 extremities. Skin: Skin color, texture, turgor normal.  No rashes or lesions.   Neurologic:  No focal deficits, cannot Already on Anticoagulation     Disposition:    [x] Home       [] TCU       [] Rehab       [] Psych       [] SNF       [] [facility]       [] Other-    Code Status: Full Code    PT/OT Eval Status: pending, will start once fully awake    Assessment/Plan:    Anticipated Discharge in : 2-3 days    Active Hospital Problems    Diagnosis Date Noted    Elevated CK [R74.8]     Hypokalemia [E87.6]     Hypomagnesemia [E83.42]     Severe malnutrition ([address] [E43] 10/13/2018     Class: Acute    Alcohol withdrawal syndrome, with delirium ([address] [F10.231]     Fall down stairs Dorsie Leota 10/12/2018    Disorientation [R41.0] 10/12/2018    Unspecified open wound of scalp, initial encounter [S01.00XA] 10/12/2018    Acute pancreatitis without infection or necrosis [K85.90] 10/12/2018    Seizure-like activity ([address] [R56.9] 10/12/2018    Closed head injury with concussion [S06.0X9A] 10/12/2018    Acute pancreatitis [K85.90]     Seizure ([address] [R56.9]     Alcohol abuse [F10.10]        Severe Acute EtOH Withdrawal/Delirium Tremens  Patient is alert and awake  Receiving Ativan per DEVAN. Has history of extensive drinking  CT head negative  Hemodynamically stable  addiction services and  consultation  Continue banana bag, switch to folate, thiamine, b12 supplementation. Seizure precautions  Restraints removed  Will give tylenol prn for fever     Rhabdomyolysis  - Likely from seizure and or withdrawal.   - LR @ 200 cc/hr  - consulted nephrology  - trend ck,improving     Acute pancreatitis  likely due to alcohol, downtrending,  - tolerated diet  - continue IVFs  - no indications for antibiotics or surgery. - calcium normal.     Hypokalemia  - replace with IV K prn  - recheck K     Hypomagnesemia  - Replace PRN. Check daily.      Macrocytosis  - B12 on low end.  Will replace     Thrombocytopenia  - improving, 128 tpday  - Monitor with CBC     Fatty Liver  - Likely due to EtOH use  - elevated AST,

## 2018-10-17 NOTE — CARE COORDINATION
Remains on CIWA scale. CK at 8451 today, trending down. Remains on IV fluids of LR at 200 ml per hour. Potassium replacement today for level of 3.8, repeat BMP in AM. PT/OT following.   Electronically signed by Liberty Teran RN on 10/17/2018 at 10:41 AM

## 2018-10-17 NOTE — PROGRESS NOTES
incomprehensible words. As patient has evidence for pancreatitis and likely concussion, trauma will remain on as a consult service     Past Medical History:   Diagnosis Date    Alcohol abuse     Hypertension     Seizures ([address]      No past surgical history on file. Restrictions/Precautions:  General Precautions, Fall Risk, Seizure                    Other position/activity restrictions: ETOH hx        Prior Level of Function:  ADL Assistance: Independent  Homemaking Assistance: Independent  Ambulation Assistance: Independent  Transfer Assistance: Independent  Additional Comments: Mother reports that Pt did not have a physical limitations PLOF. Subjective       Subjective: Pt supine in bed sleeping upon arrival, required increased time and cuing to awake, although once woke was agreeable to OT and remained awake throughout remainder of session. Mother present throughout. Comments: RN ok'd session, reporting pt has been appropriate, following commands since this morning. Overall Orientation Status: Impaired  Orientation Level: Oriented to person;Oriented to place; Disoriented to time;Disoriented to situation (thought was October 30)         Pain:  Pain Assessment  Patient Currently in Pain: Denies       Objective  Overall Cognitive Status: Exceptions  Arousal/Alertness: Appropriate responses to stimuli  Following Commands: Follows multistep commands with increased time; Follows multistep commands with repitition  Memory: Decreased recall of recent events  Safety Judgement: Decreased awareness of need for safety  Problem Solving: Decreased awareness of errors;Assistance required to identify errors made  Insights: Decreased awareness of deficits  Initiation: Requires cues for some  Sequencing: Requires cues for some    Perception  Overall Perceptual Status: WFL                                                              ADL  LE Dressing: Contact guard assistance (for sitting balance while donning socks; Activity Tolerance:  Activity Tolerance: Patient Tolerated treatment well;Patient limited by fatigue  Activity Tolerance: Pt tolerated sitting at EOB ~7 minutes before completing transfers. After session completed, pt requesting to return to bed. Education provided re: importance of remaining in chair minimum of 1 hour, with recommendations given to try and stay up in chair until after finishes eating lunch. Pt verbalizing understanding and agreeable to remain in chair. RN informed. Assessment:  Assessment: Pt demoing progress, following commands during session and able to tolerate increased activity OOB. Pt will continue to benefit from OT services to increase independence with ADLs, transfers, and mobility to return to [facility]. Performance deficits / Impairments: Decreased functional mobility , Decreased cognition, Decreased ADL status, Decreased balance, Decreased safe awareness  Prognosis: Fair    Discharge Recommendations:  Discharge Recommendations: Continue to assess pending progress, Patient would benefit from continued therapy after discharge, 24 hour supervision or assist    Patient Education:  Patient Education: OT role, safety with transfers/mobility, BUE HEP  Barriers to Learning: confusion    Equipment Recommendations: Other: Continue to assess pending progress. Safety:  Safety Devices in place: Yes  Type of devices:  All fall risk precautions in place, Call light within reach, Chair alarm in place, Gait belt, Left in chair, Nurse notified  Restraints  Initially in place: Yes  Restraints: B wrist restraints reapplied after UE assessment    Plan:  Times per week: 5x  Current Treatment Recommendations: Balance Training, Self-Care / ADL, Safety Education & Training, Functional Mobility Training, Endurance Training, Cognitive Reorientation    Goals:  Patient goals : Pt unable to state    Short term goals  Time Frame for Short term goals: 2 weeks  Short term goal 1: Pt will follow 50% of simple commands to increase participation in ADL tasks. Goal met, revised. Short term goal 2: Pt will tolerate further assessment of mobility by OTR . Goal met, revised. Long term goals  Time Frame for Long term goals : No LTG set d/t short ELOS    Revised Short-Term Goals  Short term goals  Time Frame for Short term goals: 2 weeks  Short term goal 1: Pt will complete BADL tasks with SBA and <2 cues for safety to increase independence with self care tasks. Short term goal 2: Pt will complete all functional transfers with SBA and <2 cues for safety in prep for toilet/shower transfers. Short term goal 3: Pt will complete functional mobility to/from bathroom, progressing to household distances, with SBA, no LOB, and minimal cues for safety to increase activity tolerance needed for ADL completion. Short term goal 4: pt will complete dynamic standing X5 minutes with 1-2 hand release and SBA in prep for showering/dressing tasks.    Long term goals  Time Frame for Long term goals : No LTG set d/t short ELOS

## 2018-10-17 NOTE — PROGRESS NOTES
Spoke with pts nurse Norma Lee regarding an SBIRT screening per trauma services protocol. Pt is not alert and oriented at this time.

## 2018-10-18 ENCOUNTER — APPOINTMENT (OUTPATIENT)
Dept: GENERAL RADIOLOGY | Age: 43
DRG: 720 | End: 2018-10-18
Payer: MEDICARE

## 2018-10-18 LAB
ANION GAP SERPL CALCULATED.3IONS-SCNC: 14 MEQ/L (ref 8–16)
ATYPICAL LYMPHOCYTES: ABNORMAL %
BACTERIA: ABNORMAL /HPF
BASOPHILIA: ABNORMAL
BASOPHILS # BLD: 0 %
BASOPHILS ABSOLUTE: 0 THOU/MM3 (ref 0–0.1)
BILIRUBIN URINE: NEGATIVE
BLOOD CULTURE, ROUTINE: NORMAL
BLOOD CULTURE, ROUTINE: NORMAL
BLOOD, URINE: ABNORMAL
BUN BLDV-MCNC: 13 MG/DL (ref 7–22)
CALCIUM SERPL-MCNC: 8.3 MG/DL (ref 8.5–10.5)
CASTS 2: ABNORMAL /LPF
CASTS UA: ABNORMAL /LPF
CHARACTER, URINE: CLEAR
CHLORIDE BLD-SCNC: 97 MEQ/L (ref 98–111)
CO2: 24 MEQ/L (ref 23–33)
COLOR: YELLOW
CREAT SERPL-MCNC: 0.5 MG/DL (ref 0.4–1.2)
CRYSTALS, UA: ABNORMAL
DIFFERENTIAL TYPE: ABNORMAL
EKG ATRIAL RATE: 135 BPM
EKG P AXIS: 77 DEGREES
EKG P-R INTERVAL: 138 MS
EKG Q-T INTERVAL: 280 MS
EKG QRS DURATION: 88 MS
EKG QTC CALCULATION (BAZETT): 420 MS
EKG R AXIS: 44 DEGREES
EKG T AXIS: 74 DEGREES
EKG VENTRICULAR RATE: 135 BPM
EOSINOPHIL # BLD: 1 %
EOSINOPHILS ABSOLUTE: 0 THOU/MM3 (ref 0–0.4)
EPITHELIAL CELLS, UA: ABNORMAL /HPF
ERYTHROCYTE [DISTWIDTH] IN BLOOD BY AUTOMATED COUNT: 13 % (ref 11.5–14.5)
ERYTHROCYTE [DISTWIDTH] IN BLOOD BY AUTOMATED COUNT: 13.2 % (ref 11.5–14.5)
ERYTHROCYTE [DISTWIDTH] IN BLOOD BY AUTOMATED COUNT: 45.7 FL (ref 35–45)
ERYTHROCYTE [DISTWIDTH] IN BLOOD BY AUTOMATED COUNT: 48.7 FL (ref 35–45)
GFR SERPL CREATININE-BSD FRML MDRD: > 90 ML/MIN/1.73M2
GLUCOSE BLD-MCNC: 104 MG/DL (ref 70–108)
GLUCOSE BLD-MCNC: 115 MG/DL (ref 70–108)
GLUCOSE BLD-MCNC: 86 MG/DL (ref 70–108)
GLUCOSE BLD-MCNC: 93 MG/DL (ref 70–108)
GLUCOSE BLD-MCNC: 96 MG/DL (ref 70–108)
GLUCOSE URINE: NEGATIVE MG/DL
HCT VFR BLD CALC: 36.4 % (ref 42–52)
HCT VFR BLD CALC: 37.1 % (ref 42–52)
HEMOGLOBIN: 12.2 GM/DL (ref 14–18)
HEMOGLOBIN: 13 GM/DL (ref 14–18)
KETONES, URINE: NEGATIVE
LACTIC ACID: 2.3 MMOL/L (ref 0.5–2.2)
LEUKOCYTE ESTERASE, URINE: ABNORMAL
LYMPHOCYTES # BLD: 8 %
LYMPHOCYTES ABSOLUTE: 0.1 THOU/MM3 (ref 1–4.8)
MAGNESIUM: 1.9 MG/DL (ref 1.6–2.4)
MAGNESIUM: 1.9 MG/DL (ref 1.6–2.4)
MCH RBC QN AUTO: 33.2 PG (ref 26–33)
MCH RBC QN AUTO: 33.7 PG (ref 26–33)
MCHC RBC AUTO-ENTMCNC: 33.5 GM/DL (ref 32.2–35.5)
MCHC RBC AUTO-ENTMCNC: 35 GM/DL (ref 32.2–35.5)
MCV RBC AUTO: 96.1 FL (ref 80–94)
MCV RBC AUTO: 98.9 FL (ref 80–94)
MISCELLANEOUS 2: ABNORMAL
MONOCYTES # BLD: 6 %
MONOCYTES ABSOLUTE: 0.1 THOU/MM3 (ref 0.4–1.3)
MYELOCYTES: 2 %
NITRITE, URINE: NEGATIVE
NUCLEATED RED BLOOD CELLS: 0 /100 WBC
PATHOLOGIST REVIEW: ABNORMAL
PH UA: 6.5
PLATELET # BLD: 78 THOU/MM3 (ref 130–400)
PLATELET # BLD: 99 THOU/MM3 (ref 130–400)
PLATELET ESTIMATE: ABNORMAL
PMV BLD AUTO: 10.7 FL (ref 9.4–12.4)
PMV BLD AUTO: 11.1 FL (ref 9.4–12.4)
POTASSIUM SERPL-SCNC: 3.5 MEQ/L (ref 3.5–5.2)
PROCALCITONIN: 19.19 NG/ML (ref 0.01–0.09)
PROTEIN UA: NEGATIVE
RBC # BLD: 3.68 MILL/MM3 (ref 4.7–6.1)
RBC # BLD: 3.86 MILL/MM3 (ref 4.7–6.1)
RBC URINE: ABNORMAL /HPF
RENAL EPITHELIAL, UA: ABNORMAL
SCAN OF BLOOD SMEAR: NORMAL
SEG NEUTROPHILS: 83 %
SEGMENTED NEUTROPHILS ABSOLUTE COUNT: 1.3 THOU/MM3 (ref 1.8–7.7)
SODIUM BLD-SCNC: 135 MEQ/L (ref 135–145)
SPECIFIC GRAVITY, URINE: 1.01 (ref 1–1.03)
TOTAL CK: 3613 U/L (ref 55–170)
TOTAL CK: 5612 U/L (ref 55–170)
TROPONIN T: < 0.01 NG/ML
UROBILINOGEN, URINE: 1 EU/DL
WBC # BLD: 1.6 THOU/MM3 (ref 4.8–10.8)
WBC # BLD: 9.6 THOU/MM3 (ref 4.8–10.8)
WBC UA: ABNORMAL /HPF
YEAST: ABNORMAL

## 2018-10-18 PROCEDURE — 87899 AGENT NOS ASSAY W/OPTIC: CPT

## 2018-10-18 PROCEDURE — 6370000000 HC RX 637 (ALT 250 FOR IP): Performed by: INTERNAL MEDICINE

## 2018-10-18 PROCEDURE — 2709999900 HC NON-CHARGEABLE SUPPLY

## 2018-10-18 PROCEDURE — 93010 ELECTROCARDIOGRAM REPORT: CPT | Performed by: NUCLEAR MEDICINE

## 2018-10-18 PROCEDURE — 82550 ASSAY OF CK (CPK): CPT

## 2018-10-18 PROCEDURE — 99232 SBSQ HOSP IP/OBS MODERATE 35: CPT | Performed by: INTERNAL MEDICINE

## 2018-10-18 PROCEDURE — 97127 HC SP THER IVNTJ W/FOCUS COG FUNCJ: CPT

## 2018-10-18 PROCEDURE — 2580000003 HC RX 258: Performed by: FAMILY MEDICINE

## 2018-10-18 PROCEDURE — 87184 SC STD DISK METHOD PER PLATE: CPT

## 2018-10-18 PROCEDURE — 87081 CULTURE SCREEN ONLY: CPT

## 2018-10-18 PROCEDURE — 2580000003 HC RX 258: Performed by: INTERNAL MEDICINE

## 2018-10-18 PROCEDURE — 97530 THERAPEUTIC ACTIVITIES: CPT

## 2018-10-18 PROCEDURE — 87205 SMEAR GRAM STAIN: CPT

## 2018-10-18 PROCEDURE — 81001 URINALYSIS AUTO W/SCOPE: CPT

## 2018-10-18 PROCEDURE — 83605 ASSAY OF LACTIC ACID: CPT

## 2018-10-18 PROCEDURE — 6370000000 HC RX 637 (ALT 250 FOR IP): Performed by: NURSE PRACTITIONER

## 2018-10-18 PROCEDURE — 97110 THERAPEUTIC EXERCISES: CPT

## 2018-10-18 PROCEDURE — 87449 NOS EACH ORGANISM AG IA: CPT

## 2018-10-18 PROCEDURE — 6370000000 HC RX 637 (ALT 250 FOR IP): Performed by: HOSPITALIST

## 2018-10-18 PROCEDURE — 87070 CULTURE OTHR SPECIMN AEROBIC: CPT

## 2018-10-18 PROCEDURE — 2060000000 HC ICU INTERMEDIATE R&B

## 2018-10-18 PROCEDURE — APPSS30 APP SPLIT SHARED TIME 16-30 MINUTES: Performed by: NURSE PRACTITIONER

## 2018-10-18 PROCEDURE — 99233 SBSQ HOSP IP/OBS HIGH 50: CPT | Performed by: FAMILY MEDICINE

## 2018-10-18 PROCEDURE — 6360000002 HC RX W HCPCS: Performed by: FAMILY MEDICINE

## 2018-10-18 PROCEDURE — 97535 SELF CARE MNGMENT TRAINING: CPT

## 2018-10-18 PROCEDURE — 97116 GAIT TRAINING THERAPY: CPT

## 2018-10-18 PROCEDURE — 83735 ASSAY OF MAGNESIUM: CPT

## 2018-10-18 PROCEDURE — 80048 BASIC METABOLIC PNL TOTAL CA: CPT

## 2018-10-18 PROCEDURE — 6360000002 HC RX W HCPCS: Performed by: INTERNAL MEDICINE

## 2018-10-18 PROCEDURE — 87186 SC STD MICRODIL/AGAR DIL: CPT

## 2018-10-18 PROCEDURE — 36415 COLL VENOUS BLD VENIPUNCTURE: CPT

## 2018-10-18 PROCEDURE — 71046 X-RAY EXAM CHEST 2 VIEWS: CPT

## 2018-10-18 PROCEDURE — 85027 COMPLETE CBC AUTOMATED: CPT

## 2018-10-18 PROCEDURE — 6370000000 HC RX 637 (ALT 250 FOR IP): Performed by: PSYCHIATRY & NEUROLOGY

## 2018-10-18 PROCEDURE — 87040 BLOOD CULTURE FOR BACTERIA: CPT

## 2018-10-18 PROCEDURE — 82948 REAGENT STRIP/BLOOD GLUCOSE: CPT

## 2018-10-18 PROCEDURE — 84484 ASSAY OF TROPONIN QUANT: CPT

## 2018-10-18 PROCEDURE — 87077 CULTURE AEROBIC IDENTIFY: CPT

## 2018-10-18 RX ORDER — SODIUM CHLORIDE 9 MG/ML
INJECTION, SOLUTION INTRAVENOUS CONTINUOUS
Status: DISCONTINUED | OUTPATIENT
Start: 2018-10-18 | End: 2018-10-20 | Stop reason: HOSPADM

## 2018-10-18 RX ORDER — POTASSIUM CHLORIDE 20 MEQ/1
40 TABLET, EXTENDED RELEASE ORAL ONCE
Status: COMPLETED | OUTPATIENT
Start: 2018-10-18 | End: 2018-10-18

## 2018-10-18 RX ORDER — MAGNESIUM SULFATE IN WATER 40 MG/ML
2 INJECTION, SOLUTION INTRAVENOUS ONCE
Status: COMPLETED | OUTPATIENT
Start: 2018-10-18 | End: 2018-10-18

## 2018-10-18 RX ORDER — 0.9 % SODIUM CHLORIDE 0.9 %
500 INTRAVENOUS SOLUTION INTRAVENOUS ONCE
Status: COMPLETED | OUTPATIENT
Start: 2018-10-18 | End: 2018-10-18

## 2018-10-18 RX ADMIN — Medication 10 ML: at 15:28

## 2018-10-18 RX ADMIN — SODIUM CHLORIDE, POTASSIUM CHLORIDE, SODIUM LACTATE AND CALCIUM CHLORIDE 500 ML: 600; 310; 30; 20 INJECTION, SOLUTION INTRAVENOUS at 00:00

## 2018-10-18 RX ADMIN — MAGNESIUM SULFATE HEPTAHYDRATE 2 G: 40 INJECTION, SOLUTION INTRAVENOUS at 03:35

## 2018-10-18 RX ADMIN — POTASSIUM CHLORIDE 40 MEQ: 20 TABLET, EXTENDED RELEASE ORAL at 15:31

## 2018-10-18 RX ADMIN — VANCOMYCIN HYDROCHLORIDE 1000 MG: 1 INJECTION, POWDER, LYOPHILIZED, FOR SOLUTION INTRAVENOUS at 15:27

## 2018-10-18 RX ADMIN — PIPERACILLIN SODIUM,TAZOBACTAM SODIUM 3.38 G: 3; .375 INJECTION, POWDER, FOR SOLUTION INTRAVENOUS at 18:32

## 2018-10-18 RX ADMIN — SODIUM CHLORIDE 500 ML: 9 INJECTION, SOLUTION INTRAVENOUS at 15:26

## 2018-10-18 RX ADMIN — PANTOPRAZOLE SODIUM 40 MG: 40 TABLET, DELAYED RELEASE ORAL at 07:50

## 2018-10-18 RX ADMIN — ACETAMINOPHEN 650 MG: 325 TABLET ORAL at 23:35

## 2018-10-18 RX ADMIN — VANCOMYCIN HYDROCHLORIDE 1000 MG: 1 INJECTION, POWDER, LYOPHILIZED, FOR SOLUTION INTRAVENOUS at 23:32

## 2018-10-18 RX ADMIN — FOLIC ACID 1 MG: 1 TABLET ORAL at 08:49

## 2018-10-18 RX ADMIN — Medication 1000 MCG: at 08:49

## 2018-10-18 RX ADMIN — PIPERACILLIN SODIUM,TAZOBACTAM SODIUM 3.38 G: 3; .375 INJECTION, POWDER, FOR SOLUTION INTRAVENOUS at 13:09

## 2018-10-18 RX ADMIN — SODIUM CHLORIDE: 9 INJECTION, SOLUTION INTRAVENOUS at 21:20

## 2018-10-18 RX ADMIN — Medication 100 MG: at 08:49

## 2018-10-18 ASSESSMENT — ENCOUNTER SYMPTOMS
HEMOPTYSIS: 0
DOUBLE VISION: 0
COUGH: 1
NAUSEA: 0
ORTHOPNEA: 0
ABDOMINAL PAIN: 0
BLURRED VISION: 0
WHEEZING: 0
PHOTOPHOBIA: 0
EYE PAIN: 0
VOMITING: 0
SHORTNESS OF BREATH: 0
DIARRHEA: 0
BACK PAIN: 0

## 2018-10-18 ASSESSMENT — PAIN DESCRIPTION - PAIN TYPE: TYPE: ACUTE PAIN

## 2018-10-18 ASSESSMENT — PAIN DESCRIPTION - LOCATION: LOCATION: HEAD

## 2018-10-18 ASSESSMENT — PAIN SCALES - GENERAL
PAINLEVEL_OUTOF10: 0
PAINLEVEL_OUTOF10: 7
PAINLEVEL_OUTOF10: 0

## 2018-10-18 ASSESSMENT — PAIN DESCRIPTION - ORIENTATION: ORIENTATION: POSTERIOR

## 2018-10-18 ASSESSMENT — PAIN DESCRIPTION - DESCRIPTORS: DESCRIPTORS: ACHING

## 2018-10-18 NOTE — PROGRESS NOTES
appropriately interactive. Last 3 CBC   Recent Labs      10/16/18   0415  10/17/18   0443  10/18/18   0026   WBC  7.1  10.5  1.6*   RBC  3.99*  3.77*  3.86*   HGB  13.4*  12.7*  13.0*   HCT  38.8*  36.1*  37.1*   PLT  129*  128*  78*     Last 3 CMP  Recent Labs      10/15/18   1806  10/16/18   0415  10/16/18   1957  10/17/18   0410   NA  136  138   --   135   K  3.1*  3.2*  3.7  3.8   CL  95*  100   --   98   CO2  22*  22*   --   25   BUN  3*  <2*   --   6*   CREATININE  0.3*  0.3*   --   0.5   CALCIUM  8.4*  8.2*   --   8.0*     Results for Claudette Philip (MRN [id number]) as of 10/18/2018 08:23   Ref. Range 10/17/2018 23:31   Calcium, Ion Latest Ref Range: 1.12 - 1.32 mmol/L 1.14   Lactic Acid Latest Ref Range: 0.5 - 2.2 mmol/L 2.8 (H)   Procalcitonin Latest Ref Range: 0.01 - 0.09 ng/mL 19.19 (H)     Results for Claudette Philip (MRN [id number]) as of 10/18/2018 08:23   Ref. Range 10/17/2018 19:34   Total CK Latest Ref Range: 55 - 170 U/L 7,636 (H)     CXR 10/18/18      Narrative   PROCEDURE: XR CHEST (2 VW)       CLINICAL INFORMATION: fever; productive cough,         COMPARISON: 10/12/2018       TECHNIQUE:  AP and lateral chest x-ray         FINDINGS:        The heart size is normal. There is a small sided pleural effusion. Dependent left basilar airspace disease is demonstrated and may represent atelectasis or pneumonia. No pneumothorax is seen. No acute osseous findings are demonstrated.            Impression   1. Small sided pleural effusion. There is also dependent left basilar airspace disease demonstrated which may represent atelectasis or pneumonia. Recommend follow-up to document resolution. Assessment    1. Acute rhabdo, with CK >20,000, likely 2/2 seizure/ETOH withdrawal, trending down with aggressive IVF administration  - Down to 7636 last night. Awaiting am labs. UPDATE: Labs reviewed, renal function remains intact, will replace potassium. Calcium improving at 8.3 today.    -

## 2018-10-18 NOTE — PROGRESS NOTES
naming tasks with 70% accuracy to imrpve mental flexibility  INTERVENTIONS:   Divergent naming: Target 8 members  Trial 1 Resturaunts: 2 indep, 6 mod cues  Trial 2 Breakfast foods:  5 indep, 3 mod cues        ASSESSMENT:  Assessment: [x]Progressing towards goals          []Not Progressing towards goals       Patient Tolerance of Treatment:   [x]Tolerated well []Tolerated fair []Required rest breaks []Fatigued  Plan for Next Session: problem solving/reasoning   Education:  Learner:  [x]Patient          []Significant Other          [x]Other: Mom   Education provided regarding:  [x]Goals and POC   [x]Diet and swallowing precautions    []Home Exercise Program  [x]Progress and/or discharge information  Method of Education:  [x]Discussion          [x]Demonstration          []Handout          []Other  Evaluation of Education:   [x]Verbalized understanding   []Demonstrates without assistance  [x]Demonstrates with assistance  []Needs further instruction     []No evidence of learning                  []No family present      Plan: [x]Continue with current plan of care    []Modify current plan of care as follows:    []Discharge patient    Discharge Location:    Services/Supervision Recommended:     [x]Patient continues to require treatment by a licensed therapist to address functional deficits as outlined in the established plan of care.      VIKI Emmanuel 23

## 2018-10-18 NOTE — PROGRESS NOTES
incomprehensible words. As patient has evidence for pancreatitis and likely concussion, trauma will remain on as a consult service     Past Medical History:   Diagnosis Date    Alcohol abuse     Hypertension     Seizures ([address]      No past surgical history on file. Restrictions/Precautions:  General Precautions, Fall Risk, Seizure                    Other position/activity restrictions: ETOH hx        Prior Level of Function:  ADL Assistance: Independent  Homemaking Assistance: Independent  Ambulation Assistance: Independent  Transfer Assistance: Independent  Additional Comments: Mother reports that Pt did not have a physical limitations PLOF.      Subjective:     Subjective: pt in bed needed encouragement to get up, pt had a hard time seeing the clock or his watch he reported he has glasses but usually only wears when reading     Pain:   .  Pain Assessment  Pain Level: 0       Social/Functional:  Lives With: Alone  Type of Home: Apartment  Home Layout: One level  Home Access: Stairs to enter without rails (1 flight)     Objective:  Supine to Sit: Stand by assistance (with extra time)    Transfers  Sit to Stand: Minimal Assistance (cues for hand placemnt )  Stand to sit: Minimal Assistance (max cues for backing all the way up and sequencing technique )       Ambulation 1  Device: [address]: Contact guard assistance (to min assist )  Quality of Gait: slow emmett he did better wtih step length and stayin gin walker but needing min assist when turning around because he would step out of walker and poor processing of backing upto chair   Distance: 100x1             Exercises:  Exercises  Comments: pt completed seated toe raises, heel raises with min resistance at knees, long arc quads, hip flexion, hip abd/add with lt manual resistance, hamstring curls with lt manual resistance x 15 reps each                    Activity Tolerance:  Activity Tolerance: Patient limited by fatigue;Patient limited by

## 2018-10-18 NOTE — PROGRESS NOTES
guard assistance (sitting edge  of recliner )  Standing Balance: Minimal assistance     Time: x5 min   Activity: standing at sink for grooming task      Functional Mobility  Functional - Mobility Device: Rolling Walker  Activity: To/from bathroom  Assist Level: Minimal assistance  Functional Mobility Comments: pt ambulated to and from BR unsteady and x1 LOB with therapist to A to regain balance. Pt impulsive at times and has poor vision           Vision  Patient Visual Report: Unable to keep objects in focus;Balance difficulty  Vision Comment: pt has difficulty finding water at sink for washing out toothbrush, not leticia to focus on therapist face or able to locate toliet paper to R side of STS               Activity Tolerance:  Activity Tolerance: Patient Tolerated treatment well;Patient limited by fatigue    Assessment:     Performance deficits / Impairments: Decreased functional mobility , Decreased cognition, Decreased ADL status, Decreased balance, Decreased safe awareness  Prognosis: Fair    Discharge Recommendations:  Discharge Recommendations: Patient would benefit from continued therapy after discharge, IP Rehab, ECF with OT    Patient Education:  Patient Education: safety with transfers, mobility and ADLS   Barriers to Learning: confusion    Equipment Recommendations: Other: Continue to assess pending progress. Safety:  Safety Devices in place: Yes  Type of devices:  All fall risk precautions in place, Call light within reach, Left in chair, Chair alarm in place, Nurse notified, Gait belt  Restraints  Initially in place: Yes  Restraints: B wrist restraints reapplied after UE assessment    Plan:  Times per week: 5x  Current Treatment Recommendations: Balance Training, Self-Care / ADL, Safety Education & Training, Functional Mobility Training, Endurance Training, Cognitive Reorientation    Goals:  Patient goals : Pt unable to state    Short term goals  Time Frame for Short term goals: 2 weeks  Short term

## 2018-10-19 ENCOUNTER — APPOINTMENT (OUTPATIENT)
Dept: MRI IMAGING | Age: 43
DRG: 720 | End: 2018-10-19
Payer: MEDICARE

## 2018-10-19 LAB
ANION GAP SERPL CALCULATED.3IONS-SCNC: 11 MEQ/L (ref 8–16)
BUN BLDV-MCNC: 10 MG/DL (ref 7–22)
CALCIUM SERPL-MCNC: 8.3 MG/DL (ref 8.5–10.5)
CHLORIDE BLD-SCNC: 105 MEQ/L (ref 98–111)
CO2: 24 MEQ/L (ref 23–33)
CREAT SERPL-MCNC: 0.5 MG/DL (ref 0.4–1.2)
ERYTHROCYTE [DISTWIDTH] IN BLOOD BY AUTOMATED COUNT: 13.5 % (ref 11.5–14.5)
ERYTHROCYTE [DISTWIDTH] IN BLOOD BY AUTOMATED COUNT: 49.4 FL (ref 35–45)
GFR SERPL CREATININE-BSD FRML MDRD: > 90 ML/MIN/1.73M2
GLUCOSE BLD-MCNC: 110 MG/DL (ref 70–108)
GLUCOSE BLD-MCNC: 114 MG/DL (ref 70–108)
GLUCOSE BLD-MCNC: 152 MG/DL (ref 70–108)
GLUCOSE BLD-MCNC: 84 MG/DL (ref 70–108)
GLUCOSE BLD-MCNC: 96 MG/DL (ref 70–108)
HCT VFR BLD CALC: 34.6 % (ref 42–52)
HEMOGLOBIN: 11.6 GM/DL (ref 14–18)
MAGNESIUM: 1.7 MG/DL (ref 1.6–2.4)
MCH RBC QN AUTO: 33.4 PG (ref 26–33)
MCHC RBC AUTO-ENTMCNC: 33.5 GM/DL (ref 32.2–35.5)
MCV RBC AUTO: 99.7 FL (ref 80–94)
ORGANISM: ABNORMAL
PLATELET # BLD: 105 THOU/MM3 (ref 130–400)
PMV BLD AUTO: 11.5 FL (ref 9.4–12.4)
POTASSIUM SERPL-SCNC: 3.6 MEQ/L (ref 3.5–5.2)
RBC # BLD: 3.47 MILL/MM3 (ref 4.7–6.1)
SODIUM BLD-SCNC: 140 MEQ/L (ref 135–145)
TOTAL CK: 1150 U/L (ref 55–170)
TOTAL CK: 2530 U/L (ref 55–170)
URINE CULTURE REFLEX: ABNORMAL
WBC # BLD: 14.4 THOU/MM3 (ref 4.8–10.8)

## 2018-10-19 PROCEDURE — 85027 COMPLETE CBC AUTOMATED: CPT

## 2018-10-19 PROCEDURE — 2709999900 HC NON-CHARGEABLE SUPPLY

## 2018-10-19 PROCEDURE — 6370000000 HC RX 637 (ALT 250 FOR IP): Performed by: PSYCHIATRY & NEUROLOGY

## 2018-10-19 PROCEDURE — 6370000000 HC RX 637 (ALT 250 FOR IP): Performed by: INTERNAL MEDICINE

## 2018-10-19 PROCEDURE — 80048 BASIC METABOLIC PNL TOTAL CA: CPT

## 2018-10-19 PROCEDURE — 82948 REAGENT STRIP/BLOOD GLUCOSE: CPT

## 2018-10-19 PROCEDURE — 6360000002 HC RX W HCPCS: Performed by: FAMILY MEDICINE

## 2018-10-19 PROCEDURE — 97530 THERAPEUTIC ACTIVITIES: CPT

## 2018-10-19 PROCEDURE — 2060000000 HC ICU INTERMEDIATE R&B

## 2018-10-19 PROCEDURE — 97110 THERAPEUTIC EXERCISES: CPT

## 2018-10-19 PROCEDURE — 2580000003 HC RX 258: Performed by: INTERNAL MEDICINE

## 2018-10-19 PROCEDURE — 82550 ASSAY OF CK (CPK): CPT

## 2018-10-19 PROCEDURE — 70551 MRI BRAIN STEM W/O DYE: CPT

## 2018-10-19 PROCEDURE — 97116 GAIT TRAINING THERAPY: CPT

## 2018-10-19 PROCEDURE — 83735 ASSAY OF MAGNESIUM: CPT

## 2018-10-19 PROCEDURE — 99233 SBSQ HOSP IP/OBS HIGH 50: CPT | Performed by: FAMILY MEDICINE

## 2018-10-19 PROCEDURE — 2580000003 HC RX 258: Performed by: FAMILY MEDICINE

## 2018-10-19 PROCEDURE — 6370000000 HC RX 637 (ALT 250 FOR IP): Performed by: HOSPITALIST

## 2018-10-19 PROCEDURE — 36415 COLL VENOUS BLD VENIPUNCTURE: CPT

## 2018-10-19 PROCEDURE — 6370000000 HC RX 637 (ALT 250 FOR IP): Performed by: FAMILY MEDICINE

## 2018-10-19 PROCEDURE — 99231 SBSQ HOSP IP/OBS SF/LOW 25: CPT | Performed by: INTERNAL MEDICINE

## 2018-10-19 RX ORDER — DOCUSATE SODIUM 100 MG/1
100 CAPSULE, LIQUID FILLED ORAL 2 TIMES DAILY
Status: DISCONTINUED | OUTPATIENT
Start: 2018-10-19 | End: 2018-10-20 | Stop reason: HOSPADM

## 2018-10-19 RX ORDER — POTASSIUM CHLORIDE 20 MEQ/1
40 TABLET, EXTENDED RELEASE ORAL ONCE
Status: COMPLETED | OUTPATIENT
Start: 2018-10-19 | End: 2018-10-19

## 2018-10-19 RX ORDER — SENNA PLUS 8.6 MG/1
1 TABLET ORAL NIGHTLY
Status: DISCONTINUED | OUTPATIENT
Start: 2018-10-19 | End: 2018-10-20 | Stop reason: HOSPADM

## 2018-10-19 RX ADMIN — VANCOMYCIN HYDROCHLORIDE 1000 MG: 1 INJECTION, POWDER, LYOPHILIZED, FOR SOLUTION INTRAVENOUS at 10:29

## 2018-10-19 RX ADMIN — PANTOPRAZOLE SODIUM 40 MG: 40 TABLET, DELAYED RELEASE ORAL at 09:04

## 2018-10-19 RX ADMIN — PIPERACILLIN SODIUM,TAZOBACTAM SODIUM 3.38 G: 3; .375 INJECTION, POWDER, FOR SOLUTION INTRAVENOUS at 03:45

## 2018-10-19 RX ADMIN — Medication 1 PUFF: at 20:24

## 2018-10-19 RX ADMIN — CEFTRIAXONE SODIUM 1 G: 1 INJECTION, POWDER, FOR SOLUTION INTRAMUSCULAR; INTRAVENOUS at 16:11

## 2018-10-19 RX ADMIN — Medication 1000 MCG: at 09:02

## 2018-10-19 RX ADMIN — POTASSIUM CHLORIDE 40 MEQ: 20 TABLET, EXTENDED RELEASE ORAL at 20:24

## 2018-10-19 RX ADMIN — PIPERACILLIN SODIUM,TAZOBACTAM SODIUM 3.38 G: 3; .375 INJECTION, POWDER, FOR SOLUTION INTRAVENOUS at 10:29

## 2018-10-19 RX ADMIN — Medication 1 PUFF: at 08:56

## 2018-10-19 RX ADMIN — Medication 1 PUFF: at 13:29

## 2018-10-19 RX ADMIN — Medication 1 PUFF: at 16:16

## 2018-10-19 RX ADMIN — FOLIC ACID 1 MG: 1 TABLET ORAL at 09:02

## 2018-10-19 RX ADMIN — SODIUM CHLORIDE: 9 INJECTION, SOLUTION INTRAVENOUS at 16:11

## 2018-10-19 RX ADMIN — Medication 100 MG: at 09:02

## 2018-10-19 ASSESSMENT — ENCOUNTER SYMPTOMS
BLURRED VISION: 1
DIARRHEA: 0
ABDOMINAL PAIN: 0
BACK PAIN: 0
PHOTOPHOBIA: 0
DOUBLE VISION: 0
COUGH: 1
SHORTNESS OF BREATH: 0
VOMITING: 0
NAUSEA: 0
EYE PAIN: 0
ORTHOPNEA: 0
WHEEZING: 0
HEMOPTYSIS: 0

## 2018-10-19 ASSESSMENT — PAIN SCALES - GENERAL: PAINLEVEL_OUTOF10: 0

## 2018-10-19 NOTE — PROGRESS NOTES
Withdrawal/Delirium Tremens  Patient is alert and awake now  Has history of extensive drinking  CT head negative  addiction services and  consultation  Continue banana bag, switch to folate, thiamine, b12 supplementation. Seizure precautions  Restraints removed  tylenol prn for fever     Rhabdomyolysis  - Likely from seizure and or withdrawal.   - LR @ 200 cc/hr  - consulted nephrology  - trend ck,improving  - + hematuria     Acute pancreatitis  likely due to alcohol, downtrending,  - tolerated diet  - continue IVFs  - calcium normal.     Hypokalemia  - replace with IV K prn  - recheck K     Hypomagnesemia  - Replace PRN. Check daily.      Macrocytosis  - B12 on low end. Will replace     Thrombocytopenia  - improving, 99 today  - Monitor with CBC     Fatty Liver  - Likely due to EtOH use  - elevated AST, otherwise normal LFTs, consistent with alcohol abuse     Fever  - improved  - maybe related to DT/withdrawal and/or infectious  - but also has cough,  chest Xray showed small pleural eff, L basilar airspace ds, atelectasis vs pneumonia  - also has wbc esterace and hematuria  - procalcitonin elevated, lactate elevated    Sepsis, Pneumonia and UTI  - + fever, elevated lactate and procal, hypotension  - MRSA screen pending  - patient denies IV drug use  - currently on Vanc and Zosyn, shift to ceftriaxone  - blood culture pending  - urine culture mixed organisms  - deescalate antibiotics with culture results  - NS IV bolus    Hematuria, Acute Cystitis  - improving  - he pulled his Abrams few days ago  - rhabdomyolysis and UTI  - final cultures pending  - CT abd w contrast 10/12, There is nonspecific small amount of abdominal and pelvic ascites. There is apparent thickening of the sigmoid colon and rectum.  Findings may be related to underdistention, Underlying colitis is not excluded, no hydronephrosis    Visual problem, bilateral; Anisocoria  - cannot count fingers  - according to patient, chronic and

## 2018-10-19 NOTE — FLOWSHEET NOTE
10/15/18 0402   Provider Notification   Reason for Communication Review case  (Notify of CK >20,000)   Provider Name Dr. Nghia Vergara   Provider Notification Physician   Method of Communication Secure Message   Response Waiting for response   Notification Time 0402     0410: Physician returned message. No new orders received at this time.
Date: 10/19/2018  Patient Name: Bonilla Callejas        MRN: [id number]    Account Number: [de-identified]  : 1975  (39 y.o.)  Gender: male   Referring Practitioner: Dr. Morenita Flores  Diagnosis: acute pancreatitis    Ak Luis Angel Cera requires a wheeled walker to complete bathing, toileting, dressing and grooming tasks. A wheeled walker is also needed for use during transfer to commode, wheelchair, or bed. These tasks cannot be completed by utilizing a cane secondary to upper body weakness and the need to normalize gait patterns and decrease the risk of falling.
Edilson Cesar 59  PHYSICAL THERAPY MISSED TREATMENT NOTE  ACUTE CARE    Date: 10/16/2018  Patient Name: Mela Funes        MRN: [id number]   : 1975  (39 y.o.)  Gender: male   Referring Practitioner: Dr. Ulisses Calvo  Referring Practitioner: Dr. Jeremias Goldman  Diagnosis: acute pancreatitis  Diagnosis: Acute Pancreatitis          REASON FOR MISSED TREATMENT:  Nursing reported that pt has been resting soundly today has only been given ativan 1 time on shift but when he wakes up he is still hallucinating recommended to hold therapy today and will check back tomorrow.
Family is very willing to discuss spirituality with staff.

## 2018-10-19 NOTE — CARE COORDINATION
10/19/18, 10:39 AM      [address] day: 7  Location: [facility]/005-A Reason for admit: Acute pancreatitis without infection or necrosis [K85.90]   Procedure: CXR 10/18/18   Impression:        1. Small sided pleural effusion. There is also dependent left basilar airspace disease demonstrated which may represent atelectasis or pneumonia. Recommend follow-up to document resolution. Treatment Plan of Care: WBC 14.4 today, up from 9.6 on 10/18/18. On IV Zosyn and IV Vancomycin. PT/OT following. Recommending walker at discharge. PCP: No primary care provider on file. Readmission Risk Score: 17%  Discharge Plan: plan is home with mother at discharge. As of now patient is agreeable to [facility]. Glenn TUCKER updated.

## 2018-10-19 NOTE — PROGRESS NOTES
hours.      Meds:  Infusion:    sodium chloride 125 mL/hr at 10/19/18 1611     Meds:    docusate sodium  100 mg Oral BID    senna  1 tablet Oral Nightly    cefTRIAXone (ROCEPHIN) IV  1 g Intravenous Q24H    calcium replacement protocol   Other RX Placeholder    magnesium replacement protocol   Other RX Placeholder    thiamine  100 mg Oral Daily    folic acid  1 mg Oral Daily    vitamin B-12  1,000 mcg Oral Daily    sodium chloride flush  10 mL Intravenous 2 times per day    nicotine  1 patch Transdermal Daily    pantoprazole  40 mg Oral QAM AC     Meds prn: nicotine, hydrALAZINE, acetaminophen, sodium chloride flush, acetaminophen, ondansetron, potassium chloride **OR** potassium chloride **OR** potassium chloride, LORazepam **OR** LORazepam **OR** LORazepam **OR** LORazepam **OR** LORazepam **OR** LORazepam **OR** LORazepam **OR** LORazepam       Impression and Plan:  1. Elevated CK: improving, down to 2530  - reduce IVF rate to 60 cc/hr  - plan to stop IVF tomorrow  2. Mild borderline hypokalemia: will replace  3.  Renal: renal function is stable    D/W patient and family member    Juan Mendez MD  [facility]

## 2018-10-19 NOTE — PROGRESS NOTES
bumping into objects in room did better in open halls, pt also walked another distance with out AD with CGA to min assist noted quicker emmett adn min path deviation narrow base of support no loss of balance but unsteady at times needing min assist   Distance: 100x3    Stairs  # Steps : 9  Rails: Right ascending  Assistance: Contact guard assistance  Comment: cues to slow down as he wasn't getting entire foot on step and when he descended he would frequently catch his foot on edge of step discussed need to have someone with him going up and down steps     Balance  Comments: completed standing at maximum stance wtih no UE at support and min trunk sway and then completed alt UEs shoulder flex/ext adn shoulder horz abd/add to challange balance wtih min trunk sway, then upper trunk rotations and looking over opp shoulder with min assist due to being unsteady, attempted tandem stance he wasnt able to maintain for more than a couple of sec and same for single leg stance             Exercises:  Exercises  Comments: pt completd ex with one to two UE at support to work on strength and balance he completed heel raises, toe raises, marches, mini squats, hip abd/add, hamstring curls, hip extension x 10-15 rpes he wasnt able to follow handouts due to visual deficits however he reported once he gets home he will be able to see better              Balance Score: 7  Gait Score: 9  Tinetti Total Score: 16   HIGH FALL RISK        Activity Tolerance:  Activity Tolerance: Patient limited by endurance; Patient limited by cognitive status    Assessment:   Body structures, Functions, Activity limitations: Decreased functional mobility , Decreased strength, Decreased endurance, Decreased balance, Decreased cognition  Assessment: pt tolerated session fair however demonstrated poor safety awareness and poor awareness of his deficits as to his balance and visual deficits, he is slightly unsteady he scored 16/28 on tinetti balance test which

## 2018-10-20 VITALS
OXYGEN SATURATION: 98 % | HEART RATE: 89 BPM | TEMPERATURE: 97.4 F | SYSTOLIC BLOOD PRESSURE: 146 MMHG | RESPIRATION RATE: 20 BRPM | WEIGHT: 132.5 LBS | DIASTOLIC BLOOD PRESSURE: 92 MMHG | HEIGHT: 71 IN | BODY MASS INDEX: 18.55 KG/M2

## 2018-10-20 LAB
ANION GAP SERPL CALCULATED.3IONS-SCNC: 13 MEQ/L (ref 8–16)
BUN BLDV-MCNC: 6 MG/DL (ref 7–22)
CALCIUM SERPL-MCNC: 9.1 MG/DL (ref 8.5–10.5)
CHLORIDE BLD-SCNC: 102 MEQ/L (ref 98–111)
CO2: 25 MEQ/L (ref 23–33)
CREAT SERPL-MCNC: 0.5 MG/DL (ref 0.4–1.2)
ERYTHROCYTE [DISTWIDTH] IN BLOOD BY AUTOMATED COUNT: 13.3 % (ref 11.5–14.5)
ERYTHROCYTE [DISTWIDTH] IN BLOOD BY AUTOMATED COUNT: 49.3 FL (ref 35–45)
GFR SERPL CREATININE-BSD FRML MDRD: > 90 ML/MIN/1.73M2
GLUCOSE BLD-MCNC: 115 MG/DL (ref 70–108)
GLUCOSE BLD-MCNC: 97 MG/DL (ref 70–108)
HCT VFR BLD CALC: 36.6 % (ref 42–52)
HEMOGLOBIN: 12.1 GM/DL (ref 14–18)
MAGNESIUM: 1.4 MG/DL (ref 1.6–2.4)
MCH RBC QN AUTO: 33.2 PG (ref 26–33)
MCHC RBC AUTO-ENTMCNC: 33.1 GM/DL (ref 32.2–35.5)
MCV RBC AUTO: 100.5 FL (ref 80–94)
MRSA SCREEN: NORMAL
PLATELET # BLD: 160 THOU/MM3 (ref 130–400)
PMV BLD AUTO: 12.2 FL (ref 9.4–12.4)
POTASSIUM SERPL-SCNC: 3.8 MEQ/L (ref 3.5–5.2)
RBC # BLD: 3.64 MILL/MM3 (ref 4.7–6.1)
SODIUM BLD-SCNC: 140 MEQ/L (ref 135–145)
TOTAL CK: 807 U/L (ref 55–170)
WBC # BLD: 12.6 THOU/MM3 (ref 4.8–10.8)

## 2018-10-20 PROCEDURE — 85027 COMPLETE CBC AUTOMATED: CPT

## 2018-10-20 PROCEDURE — 36415 COLL VENOUS BLD VENIPUNCTURE: CPT

## 2018-10-20 PROCEDURE — 6370000000 HC RX 637 (ALT 250 FOR IP): Performed by: HOSPITALIST

## 2018-10-20 PROCEDURE — 99232 SBSQ HOSP IP/OBS MODERATE 35: CPT | Performed by: INTERNAL MEDICINE

## 2018-10-20 PROCEDURE — 6370000000 HC RX 637 (ALT 250 FOR IP): Performed by: PSYCHIATRY & NEUROLOGY

## 2018-10-20 PROCEDURE — 82948 REAGENT STRIP/BLOOD GLUCOSE: CPT

## 2018-10-20 PROCEDURE — 6370000000 HC RX 637 (ALT 250 FOR IP): Performed by: INTERNAL MEDICINE

## 2018-10-20 PROCEDURE — 80048 BASIC METABOLIC PNL TOTAL CA: CPT

## 2018-10-20 PROCEDURE — 83735 ASSAY OF MAGNESIUM: CPT

## 2018-10-20 PROCEDURE — 82550 ASSAY OF CK (CPK): CPT

## 2018-10-20 PROCEDURE — 99239 HOSP IP/OBS DSCHRG MGMT >30: CPT | Performed by: FAMILY MEDICINE

## 2018-10-20 RX ORDER — NICOTINE 21 MG/24HR
1 PATCH, TRANSDERMAL 24 HOURS TRANSDERMAL DAILY
Qty: 14 PATCH | Refills: 0 | Status: SHIPPED | OUTPATIENT
Start: 2018-10-20 | End: 2018-11-27

## 2018-10-20 RX ORDER — MAGNESIUM CHLORIDE 64 MG
2 TABLET, DELAYED RELEASE (ENTERIC COATED) ORAL 3 TIMES DAILY
Status: DISCONTINUED | OUTPATIENT
Start: 2018-10-20 | End: 2018-10-20 | Stop reason: HOSPADM

## 2018-10-20 RX ORDER — CEPHALEXIN 500 MG/1
500 CAPSULE ORAL 3 TIMES DAILY
Qty: 30 CAPSULE | Refills: 0 | Status: SHIPPED | OUTPATIENT
Start: 2018-10-20 | End: 2018-10-30

## 2018-10-20 RX ORDER — PSEUDOEPHEDRINE HCL 30 MG
100 TABLET ORAL 2 TIMES DAILY PRN
Qty: 30 CAPSULE | Refills: 0 | Status: SHIPPED | OUTPATIENT
Start: 2018-10-20 | End: 2018-10-30 | Stop reason: SDUPTHER

## 2018-10-20 RX ORDER — MAGNESIUM CHLORIDE 64 MG
2 TABLET, DELAYED RELEASE (ENTERIC COATED) ORAL 3 TIMES DAILY
Qty: 60 TABLET | Refills: 1 | Status: SHIPPED | OUTPATIENT
Start: 2018-10-20 | End: 2018-11-05 | Stop reason: ALTCHOICE

## 2018-10-20 RX ORDER — MAGNESIUM SULFATE IN WATER 40 MG/ML
2 INJECTION, SOLUTION INTRAVENOUS ONCE
Status: DISCONTINUED | OUTPATIENT
Start: 2018-10-20 | End: 2018-10-20 | Stop reason: HOSPADM

## 2018-10-20 RX ORDER — MAGNESIUM SULFATE HEPTAHYDRATE 500 MG/ML
2 INJECTION, SOLUTION INTRAMUSCULAR; INTRAVENOUS ONCE
Status: DISCONTINUED | OUTPATIENT
Start: 2018-10-20 | End: 2018-10-20 | Stop reason: CLARIF

## 2018-10-20 RX ADMIN — PANTOPRAZOLE SODIUM 40 MG: 40 TABLET, DELAYED RELEASE ORAL at 06:35

## 2018-10-20 RX ADMIN — Medication 100 MG: at 08:37

## 2018-10-20 RX ADMIN — FOLIC ACID 1 MG: 1 TABLET ORAL at 08:37

## 2018-10-20 RX ADMIN — Medication 1000 MCG: at 08:37

## 2018-10-20 ASSESSMENT — ENCOUNTER SYMPTOMS
EYE PAIN: 0
BLURRED VISION: 1
HEMOPTYSIS: 0
WHEEZING: 0
PHOTOPHOBIA: 0
VOMITING: 0
DIARRHEA: 0
BACK PAIN: 0
SHORTNESS OF BREATH: 0
ORTHOPNEA: 0
DOUBLE VISION: 0
ABDOMINAL PAIN: 0
NAUSEA: 0
COUGH: 1

## 2018-10-20 ASSESSMENT — PAIN SCALES - GENERAL: PAINLEVEL_OUTOF10: 0

## 2018-10-20 NOTE — PLAN OF CARE
Problem: Falls - Risk of:  Goal: Will remain free from falls  Will remain free from falls          Outcome: Ongoing  Pt educated on fall prevention measures. Chair alarm on, nonskid socks on, call light within reach. Pt ambulated with one assist and remained free from falls this shift. Problem: Risk for Impaired Skin Integrity  Goal: Tissue integrity - skin and mucous membranes  Structural intactness and normal physiological function of skin and  mucous membranes. Outcome: Ongoing  No evidence of new skin breakdown noted. Problem: Confusion - Acute:  Goal: Mental status will be restored to baseline  Mental status will be restored to baseline        Outcome: Ongoing  Pt confused and forgetful at times. Oriented x4. Problem: Discharge Planning:  Goal: Participates in care planning  Participates in care planning        Outcome: Ongoing  Pt to be discharged to home with mom. Problem: Injury - Risk of, Physical Injury:  Goal: Will remain free from falls  Will remain free from falls          Outcome: Ongoing  Pt educated on fall prevention measures. Chair alarm on, nonskid socks on, call light within reach. Pt ambulated with one assist and remained free from falls this shift. Comments: Care plan reviewed with patient. Patient verbalize understanding of the plan of care and contribute to goal setting.
Problem: Falls - Risk of:  Goal: Will remain free from falls  Will remain free from falls         Outcome: Met This Shift  Free from falls this shift, at this time. Call light and bedside table within reach. Bed alarm on. Bed wheels locked and bed in lowest position. Pt disoriented but is encouraged to use call light for needs. Goal: Absence of physical injury  Absence of physical injury         Outcome: Met This Shift  No physical injury noted this shift, at this time. Problem: Risk for Impaired Skin Integrity  Goal: Tissue integrity - skin and mucous membranes  Structural intactness and normal physiological function of skin and  mucous membranes. Outcome: Met This Shift  No signs of new skin breakdown noted with each assessment. Patient able to turn and reposition self. Assistance provided PRN. Problem: Confusion - Acute:  Goal: Absence of continued neurological deterioration signs and symptoms  Absence of continued neurological deterioration signs and symptoms        Outcome: Ongoing  Pt continues to be confused and hallucinate. Patient grabbing for things on bed that are not there. Patient also stating that he is \"smoking a cigarette\" and brings hand to mouth but no cigarette present. Patient reoriented but continues to be disoriented. Goal: Mental status will be restored to baseline  Mental status will be restored to baseline        Outcome: Not Met This Shift      Problem: Discharge Planning:  Goal: Ability to perform activities of daily living will improve  Ability to perform activities of daily living will improve        Outcome: Ongoing  To be discharged home with mother. Patient's mother requesting that patient be evaluated for rehab for alcohol addiction. No orders for discharge at this time. Goal: Participates in care planning  Participates in care planning        Outcome: Not Met This Shift  Patient confused. Unable to participate in care planning at this time.      Problem: Injury - Risk
Problem: Falls - Risk of:  Goal: Will remain free from falls  Will remain free from falls         Outcome: Ongoing  Patient ambulated to the bathroom overnight with one assist and gait belt. Patient is having difficulty seeing and was unsteady with ambulating. Problem: Risk for Impaired Skin Integrity  Goal: Tissue integrity - skin and mucous membranes  Structural intactness and normal physiological function of skin and  mucous membranes. Outcome: Ongoing  Patient has abrasions from fall on bilateral elbows, bilateral knees, and posterior left shoulder. Patient repositions self in bed frequently. Continuing to monitor. Problem: Discharge Planning:  Goal: Participates in care planning  Participates in care planning        Outcome: Ongoing  Patient states he want's to be discharged home with his mother, today. Patient is getting irritable and want's to leave. Patient has been thinking about starting the medication antabuse to assist with his addiction, but stated he was not willing to go to therapies or counseling. Problem: Injury - Risk of, Physical Injury:  Goal: Will remain free from falls  Will remain free from falls         Outcome: Ongoing  Patient ambulated to the bathroom overnight with one assist and gait belt. Patient is having difficulty seeing and was unsteady with ambulating. Problem: Musculor/Skeletal Functional Status  Goal: Highest potential functional level  Outcome: Ongoing  Patient requires moderate assistance with ADLs. Patient's vision has made it difficult for patient to complete fine motor tasks. Comments: Care plan reviewed with patient and mother. Patient and mother verbalize understanding of the plan of care and contribute to goal setting.
functional level  Outcome: Met This Shift  Patient able to ambulate with one-two assist and a walker. Problem: Nutrition  Goal: Optimal nutrition therapy  Outcome: Met This Shift  Patient eating approximately 50% this shift. Comments: Plan of Care discussed with patient and family. They verbalized understanding.

## 2018-10-21 LAB
GRAM STAIN RESULT: ABNORMAL
LEGIONELLA URINARY AG: NEGATIVE
ORGANISM: ABNORMAL
RESPIRATORY CULTURE: ABNORMAL
RESPIRATORY CULTURE: ABNORMAL
STREP PNEUMO AG, UR: NEGATIVE

## 2018-10-22 ENCOUNTER — TELEPHONE (OUTPATIENT)
Dept: INTERNAL MEDICINE CLINIC | Age: 43
End: 2018-10-22

## 2018-10-23 LAB
BLOOD CULTURE, ROUTINE: NORMAL
BLOOD CULTURE, ROUTINE: NORMAL

## 2018-10-23 NOTE — TELEPHONE ENCOUNTER
Contacted patient and patient canceled follow-up appointment . Patient states that he is going to follow -up with a provider listed on his insurance and he ask that [address] [place] [address] 's stop calling him.

## 2018-10-25 NOTE — CONSULTS
[address] [place] [address]                                 CONSULTATION    PATIENT NAME: Jazmyn Fiore                 :         1975  MED REC NO:   [id number]                           ROOM:  ACCOUNT NO:   [de-identified]                           ADMIT DATE:  10/12/2018  PROVIDER:     Drea Trujillo M.D. CORRECTED DEMOS:  10/25/18 EDB    CONSULT DATE:  10/15/2018    NEPHROLOGY CONSULTATION    REASON FOR CONSULTATION:  Rhabdomyolysis. REQUESTING PROVIDER:  Dr. Regina Padilla. HISTORY OF PRESENT ILLNESS:  This is a 50-year-old male with history  of alcohol abuse, hypertension, history of seizure, who was admitted  through the emergency room on 10/12 after he was brought by EMS. Apparently, the patient's sister called EMS reporting that patient had  a possible seizure. Upon arrival of EMS, the patient was noted to be  confused and disorientated. In the emergency room, the patient  apparently had a seizure. CAT scan of the head did not reveal any  acute changes. CAT scan of the cervical spine was also negative for  any acute changes. Further workup revealed a white count of 14.3. He  was noted to be acidotic on arrival with a pH of 7.14, pCO2 of 27, and  pO2 of 56 with calculated bicarb of 9. The patient underwent fluid  resuscitation. He was upgraded to level 2 trauma secondary to  presenting condition. Again, CAT scan of the head, neck and thoracic  supine were essentially normal.  The patient was noted to be  tachycardic. He has noted to have lipase level of greater than 600. He was then admitted for a diagnosis of pancreatitis. CAT scan of the  abdomen on admission did not reveal any hydronephrosis. Kidneys were  reported to be unremarkable. He was noted to have some thickening of  the sigmoid colon and the rectum however. Since admission, the  patient has had tachycardia.   He has been managed for acute alcohol  withdrawal.  He has received thiamine and folate. He was seen by  Neurology as well. Nephrology was asked to see him in consultation  today because of elevated CK levels and electrolyte imbalance. At the  time of my evaluation, the patient's family member was in the room who  reported herself to be patient's mother. She reported that the  patient has history of heavy alcohol abuse and basically drinks  everyday and sometimes whole day. She reported that recently there  were 13 empty bottles of beer noted in his trash can. The patient  also smokes heavily, and she reports that the patient also has history  of hypertension. No history of diabetes or any heart problems report. Currently, the patient is nonresponsive. He is on one to one watch. He is on lactated ringers at 200 mL per hour. His potassium level  this morning was 3.2, sodium was 139, bicarb of 22, BUN of 3,  creatinine of 0.3, magnesium of 1.5, phosphorus was 2.5, with a total  CK level of greater than 20,000. His urine output has been decent. He has already put out 1000 mL of urine since morning. His blood  pressure at the time of my evaluation was 146/94 with heart rate of  84. PAST MEDICAL HISTORY:  Significant for alcohol abuse, smoking,  hypertension, seizures. PAST SURGICAL HISTORY:  Unknown. FAMILY HISTORY:  Significant for coronary artery disease and heart  attack. SOCIAL HISTORY:  The patient drinks alcohol pretty much everyday and  sometimes throughout the day. He smokes everyday. Family member  denies any illicit drug abuse by the patient. HOME MEDICATIONS:  None. REVIEW OF SYSTEM:  Cannot be obtained at this time secondary to  patient's clinical condition. CURRENT MEDICATIONS:  Lactated ringers at 200 mL an hour, thiamine,  folic acid, magnesium sulfate, nicotine, Protonix. ALLERGIES:  The patient does not have any known allergies.     PHYSICAL EXAMINATION:  VITAL SIGNS:  Blood pressure again is

## 2018-10-30 ENCOUNTER — OFFICE VISIT (OUTPATIENT)
Dept: INTERNAL MEDICINE CLINIC | Age: 43
End: 2018-10-30
Payer: MEDICARE

## 2018-10-30 ENCOUNTER — TELEPHONE (OUTPATIENT)
Dept: INTERNAL MEDICINE CLINIC | Age: 43
End: 2018-10-30

## 2018-10-30 VITALS
WEIGHT: 129.8 LBS | HEART RATE: 72 BPM | BODY MASS INDEX: 18.17 KG/M2 | RESPIRATION RATE: 16 BRPM | SYSTOLIC BLOOD PRESSURE: 110 MMHG | DIASTOLIC BLOOD PRESSURE: 70 MMHG | OXYGEN SATURATION: 96 % | TEMPERATURE: 98.7 F | HEIGHT: 71 IN

## 2018-10-30 DIAGNOSIS — R56.9 SEIZURE-LIKE ACTIVITY (HCC): ICD-10-CM

## 2018-10-30 DIAGNOSIS — E83.42 HYPOMAGNESEMIA: ICD-10-CM

## 2018-10-30 DIAGNOSIS — F10.931 ALCOHOL WITHDRAWAL SYNDROME, WITH DELIRIUM (HCC): ICD-10-CM

## 2018-10-30 DIAGNOSIS — R90.89 ABNORMAL FINDING ON MRI OF BRAIN: ICD-10-CM
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
## 2018-10-30 DIAGNOSIS — E87.6 HYPOKALEMIA: ICD-10-CM

## 2018-10-30 DIAGNOSIS — K59.00 CONSTIPATION, UNSPECIFIED CONSTIPATION TYPE: ICD-10-CM

## 2018-10-30 DIAGNOSIS — R74.8 ELEVATED CK: ICD-10-CM

## 2018-10-30 DIAGNOSIS — N39.0 URINARY TRACT INFECTION WITH HEMATURIA, SITE UNSPECIFIED: ICD-10-CM

## 2018-10-30 DIAGNOSIS — Z11.4 ENCOUNTER FOR SCREENING FOR HIV: ICD-10-CM

## 2018-10-30 DIAGNOSIS — J18.9 PNEUMONIA DUE TO INFECTIOUS ORGANISM, UNSPECIFIED LATERALITY, UNSPECIFIED PART OF LUNG: ICD-10-CM

## 2018-10-30 DIAGNOSIS — K85.20 ALCOHOL-INDUCED ACUTE PANCREATITIS, UNSPECIFIED COMPLICATION STATUS: ICD-10-CM

## 2018-10-30 DIAGNOSIS — R31.0 GROSS HEMATURIA: ICD-10-CM

## 2018-10-30 DIAGNOSIS — R31.9 URINARY TRACT INFECTION WITH HEMATURIA, SITE UNSPECIFIED: ICD-10-CM

## 2018-10-30 DIAGNOSIS — F10.10 ALCOHOL ABUSE: ICD-10-CM

## 2018-10-30 PROCEDURE — 99495 TRANSJ CARE MGMT MOD F2F 14D: CPT | Performed by: NURSE PRACTITIONER

## 2018-10-30 RX ORDER — MULTIVIT-MIN/IRON FUM/FOLIC AC 7.5 MG-4
1 TABLET ORAL DAILY
Qty: 30 TABLET | Refills: 3 | Status: SHIPPED | OUTPATIENT
Start: 2018-10-30 | End: 2019-01-30

## 2018-10-30 RX ORDER — PSEUDOEPHEDRINE HCL 30 MG
100 TABLET ORAL 2 TIMES DAILY PRN
Qty: 30 CAPSULE | Refills: 0 | Status: SHIPPED | OUTPATIENT
Start: 2018-10-30 | End: 2019-03-05 | Stop reason: ALTCHOICE

## 2018-10-30 RX ORDER — THIAMINE MONONITRATE (VIT B1) 100 MG
100 TABLET ORAL DAILY
Qty: 30 TABLET | Refills: 3 | Status: SHIPPED | OUTPATIENT
Start: 2018-10-30 | End: 2019-01-30

## 2018-10-30 RX ORDER — MAGNESIUM OXIDE 400 MG/1
400 TABLET ORAL DAILY
Qty: 30 TABLET | Refills: 0 | Status: SHIPPED | OUTPATIENT
Start: 2018-10-30 | End: 2019-01-30

## 2018-10-30 RX ORDER — FOLIC ACID 1 MG/1
1 TABLET ORAL DAILY
Qty: 30 TABLET | Refills: 3 | Status: SHIPPED | OUTPATIENT
Start: 2018-10-30 | End: 2019-01-30 | Stop reason: ALTCHOICE

## 2018-10-30 ASSESSMENT — PATIENT HEALTH QUESTIONNAIRE - PHQ9
SUM OF ALL RESPONSES TO PHQ QUESTIONS 1-9: 2
SUM OF ALL RESPONSES TO PHQ QUESTIONS 1-9: 2
2. FEELING DOWN, DEPRESSED OR HOPELESS: 1
SUM OF ALL RESPONSES TO PHQ9 QUESTIONS 1 & 2: 2
1. LITTLE INTEREST OR PLEASURE IN DOING THINGS: 1

## 2018-10-30 ASSESSMENT — ENCOUNTER SYMPTOMS
ABDOMINAL DISTENTION: 0
SHORTNESS OF BREATH: 0
RHINORRHEA: 0
CHEST TIGHTNESS: 0
ABDOMINAL PAIN: 0
COLOR CHANGE: 0
DIARRHEA: 0
VOMITING: 0
SINUS PRESSURE: 0
TROUBLE SWALLOWING: 0
SORE THROAT: 0
NAUSEA: 0
CONSTIPATION: 0
EYE PAIN: 0
PHOTOPHOBIA: 0
BLOOD IN STOOL: 0
COUGH: 1
BACK PAIN: 0
VOICE CHANGE: 0

## 2018-10-30 NOTE — PATIENT INSTRUCTIONS
Obtain lab work later this week. Follow up with me next Monday to go over results. Increase fiber and water intake! Start taking multivitamin, vitamin B1, folic acid, Colace stool softener. Continue magnesium supplement and complete your course of Keflex antibiotic. Follow up with eye surgeon. Seek immediate evaluation if new or worsening symptoms. Consider doing rehab to help with history of increased alcohol intake. Recommend finding Alcoholics Anonymous for support. Referred to Neurologist to follow up on abnormal brain MRI. In 2 weeks obtain chest xray to follow up on pneumonia.

## 2018-10-30 NOTE — PROGRESS NOTES
There are patchy foci of hyperintense T2 signal throughout the periventricular and deep cerebral white matter. These are nonspecific however, may correspond to sequela of chronic microvascular ischemic angiopathy versus demyelinating disease or sequela of prior trauma or inflammation.     I offered further diagnostic tests and referral to a neurologist, but patient declined. He said that this is a chronic problem and will just establish with a primary doctor. I scheduled a f/u appt with an Ophthalmologist for Monday. Discharge summary reviewed - see chart. Results of diagnostic tests performed in hospital reviewed - seechart. Significant Diagnostic Studies     Radiology:   MRI BRAIN WO CONTRAST   Final Result       1. Parenchymal volume loss is noted which is out of proportion to the patient's age. There are patchy foci of hyperintense T2 signal throughout the periventricular and deep cerebral white matter. These are nonspecific however, may correspond to sequela    of chronic microvascular ischemic angiopathy versus demyelinating disease or sequela of prior trauma or inflammation. 2. Mucosal sinus disease is present with mucosal thickening involving the bilateral ethmoid, sphenoid and maxillary sinuses.                   **This report has been created using voice recognition software. It may contain minor errors which are inherent in voice recognition technology. **       Final report electronically signed by Dr. Pio Meza on 10/19/2018 4:08 PM       XR CHEST STANDARD (2 VW)   Final Result   1. Small sided pleural effusion. There is also dependent left basilar airspace disease demonstrated which may represent atelectasis or pneumonia. Recommend follow-up to document resolution.               **This report has been created using voice recognition software. It may contain minor errors which are inherent in voice recognition technology. **       Final report electronically signed by Dr. Jaret Reese Medication Reconciliation  Provider has reviewed medication record and it has been modified as necessary to accuratelydepict current medications since hospitalization. Adán has been instructed on these changes. HPI:     Patient was admitted to [address] [place] [address]  from 10/12 to 10/20 for:    Gross hematuria [R31.0]      Sepsis ([facility] [A41.9]      Fever [R50.9]      Elevated CK [R74.8]      Hypokalemia [E87.6]      Hypomagnesemia [E83.42]      Severe malnutrition ([address] [E43] 10/13/2018       Class: Acute    Alcohol withdrawal syndrome, with delirium ([facility] [F10.231]      Fall down stairs [W10.8XXA] 10/12/2018    Disorientation [R41.0] 10/12/2018    Unspecified open wound of scalp, initial encounter [S01.00XA] 10/12/2018    Acute pancreatitis without infection or necrosis [K85.90] 10/12/2018    Seizure-like activity ([facility] [R56.9] 10/12/2018    Closed head injury with concussion [S06.0X9A] 10/12/2018    Acute pancreatitis [K85.90]      Seizure ([facility] [R56.9]      Alcohol abuse [F10.10]          Current status:     Vision changes started on 10/10/18 after he fell down the stairs and hit head on concrete. Vision is no better or no worse in the past week, but overall he says he cannot see very well - \"blurred and dark vision without detail - I just see silhouettes of things. \" He stopped driving due to vision changes. He saw ophthalmology, Dr. Elan Ambrocio, on 10/22/18 and he was referred to eye surgeon in [place] due to retinal  Hemorrhaging - appt on 11/9/18. He says he was told he had a seizure after he fell down the stairs. He denies known seizure disorder or other neurological condition. He reports having 3 seizures in his life over the past 20 years (1 episode 5 years ago, 1 episode 10 years ago, and 1 episode about 15 years ago) due to alcohol withdrawal. His father had ALS; he denies other family history of neurological condition. No alcohol since being in the hospital. Says \"I quit. \" Prior to hospitalization he was drinking 6 pack beer per week. He does admit to over 13 shots the day he came into hospital. His mother who is here today says he is underestimating how much alcohol he drinks. He says he's gone for periods where he abstained from alcohol in the past. He is not interested in IOP or AA, but \"will consider it. \"    He c/o constipation. LBM this AM - it was hard and tiny. He is taking magnesium chloride since hospitalization due to low magnesium. Also reports some gas pains. Increased appetite. Dizziness with standing up quickly. Intermittent HAs. Left ear pain comes and goes. Chronic cough - smokes 1/2ppd, no desire to cut back right now. Present with his mother. He moved out of his own apartment because he was told it was not safe to live alone, and he is now living with his mother and sister. HPI     No Known Allergies    Social History   Substance Use Topics    Smoking status: Current Every Day Smoker     Packs/day: 0.50     Types: Cigarettes    Smokeless tobacco: Never Used    Alcohol use No      Comment: 12 pack per week / currently not drinking        Past Medical History:   Diagnosis Date    Acute pancreatitis 10/2018    Alcohol abuse     Hypertension     Pneumonia 10/2018    Retinal hemorrhage 10/2018    Rhabdomyolysis 10/2018    Seizures ([address]     Traumatic brain injury ([address] 10/2018    fell down stairs while intoxicated       No past surgical history on file. Family History   Problem Relation Age of Onset    Coronary Art Dis Mother     High Blood Pressure Mother    [de-identified] / Stillbirths Mother     Other Father 76        ALS    Alcohol Abuse Brother     Heart Disease Brother     Heart Attack Brother        Review of Systems:     Review of Systems   Constitutional: Positive for appetite change (increase) and fatigue. Negative for activity change, chills, fever and unexpected weight change.    HENT: Positive for ear pain (left ear, intermittent, x 1 week). Negative for rhinorrhea, sinus pressure, sore throat, tinnitus, trouble swallowing and voice change. Eyes: Positive for visual disturbance. Negative for photophobia and pain. Respiratory: Positive for cough (chronic). Negative for chest tightness and shortness of breath. Cardiovascular: Negative for chest pain, palpitations and leg swelling. Gastrointestinal: Negative for abdominal distention, abdominal pain, blood in stool, constipation, diarrhea, nausea and vomiting. Endocrine: Positive for polyphagia and polyuria. Negative for cold intolerance and heat intolerance. Genitourinary: Negative for discharge, dysuria, hematuria and penile pain. Musculoskeletal: Negative for arthralgias, back pain, joint swelling and myalgias. Skin: Negative for color change and rash. Neurological: Positive for dizziness (with standing up quickly) and headaches (intermittent in back of head). Negative for seizures, weakness and numbness. Hematological: Negative for adenopathy. Psychiatric/Behavioral: Negative for agitation, confusion, decreased concentration, sleep disturbance and suicidal ideas. The patient is not nervous/anxious. Exam:   /70 (Site: Left Upper Arm, Position: Sitting, Cuff Size: Medium Adult)   Pulse 72   Temp 98.7 °F (37.1 °C)   Resp 16   Ht 5' 11\" (1.803 m)   Wt 129 lb 12.8 oz (58.9 kg)   SpO2 96%   BMI 18.10 kg/m²   Body mass index is 18.1 kg/m². Wt Readings from Last 3 Encounters:   11/05/18 130 lb 6.4 oz (59.1 kg)   10/30/18 129 lb 12.8 oz (58.9 kg)   10/18/18 132 lb 8 oz (60.1 kg)     Physical Exam   Constitutional: He is oriented to person, place, and time. He appears well-developed and well-nourished. No distress. HENT:   Head: Normocephalic and atraumatic. Right Ear: Hearing, tympanic membrane, external ear and ear canal normal.   Left Ear: Hearing, tympanic membrane, external ear and ear canal normal. There is tenderness.    Eyes: Conjunctivae and EOM

## 2018-10-31 ENCOUNTER — TELEPHONE (OUTPATIENT)
Dept: INTERNAL MEDICINE CLINIC | Age: 43
End: 2018-10-31

## 2018-11-01 ENCOUNTER — HOSPITAL ENCOUNTER (OUTPATIENT)
Age: 43
Discharge: HOME | End: 2018-11-01
Payer: MEDICARE

## 2018-11-01 DIAGNOSIS — E87.6 HYPOKALEMIA: ICD-10-CM

## 2018-11-01 DIAGNOSIS — K85.20 ALCOHOL-INDUCED ACUTE PANCREATITIS, UNSPECIFIED COMPLICATION STATUS: ICD-10-CM

## 2018-11-01 DIAGNOSIS — E83.42 HYPOMAGNESEMIA: ICD-10-CM

## 2018-11-01 DIAGNOSIS — F10.10 ALCOHOL ABUSE: ICD-10-CM

## 2018-11-01 DIAGNOSIS — R74.8 ELEVATED CK: ICD-10-CM

## 2018-11-01 DIAGNOSIS — Z11.4 ENCOUNTER FOR SCREENING FOR HIV: ICD-10-CM

## 2018-11-01 LAB
ALBUMIN SERPL-MCNC: 4 G/DL (ref 3.5–5.1)
ALP BLD-CCNC: 53 U/L (ref 38–126)
ALT SERPL-CCNC: 18 U/L (ref 11–66)
ANION GAP SERPL CALCULATED.3IONS-SCNC: 15 MEQ/L (ref 8–16)
AST SERPL-CCNC: 27 U/L (ref 5–40)
BILIRUB SERPL-MCNC: 0.5 MG/DL (ref 0.3–1.2)
BILIRUBIN DIRECT: < 0.2 MG/DL (ref 0–0.3)
BUN BLDV-MCNC: 9 MG/DL (ref 7–22)
CALCIUM SERPL-MCNC: 9.3 MG/DL (ref 8.5–10.5)
CHLORIDE BLD-SCNC: 104 MEQ/L (ref 98–111)
CHOLESTEROL, TOTAL: 194 MG/DL (ref 100–199)
CO2: 21 MEQ/L (ref 23–33)
CREAT SERPL-MCNC: 0.5 MG/DL (ref 0.4–1.2)
GFR SERPL CREATININE-BSD FRML MDRD: > 90 ML/MIN/1.73M2
GLUCOSE BLD-MCNC: 78 MG/DL (ref 70–108)
HAV IGM SER IA-ACNC: NEGATIVE
HDLC SERPL-MCNC: 53 MG/DL
HEPATITIS B CORE IGM ANTIBODY: NEGATIVE
HEPATITIS B SURFACE ANTIGEN: NEGATIVE
HEPATITIS C ANTIBODY: NEGATIVE
LDL CHOLESTEROL CALCULATED: 116 MG/DL
LIPASE: 32.7 U/L (ref 5.6–51.3)
MAGNESIUM: 2.1 MG/DL (ref 1.6–2.4)
POTASSIUM SERPL-SCNC: 4.2 MEQ/L (ref 3.5–5.2)
SODIUM BLD-SCNC: 140 MEQ/L (ref 135–145)
TOTAL CK: 111 U/L (ref 55–170)
TOTAL PROTEIN: 6.9 G/DL (ref 6.1–8)
TRIGL SERPL-MCNC: 123 MG/DL (ref 0–199)

## 2018-11-01 PROCEDURE — 82248 BILIRUBIN DIRECT: CPT

## 2018-11-01 PROCEDURE — 80061 LIPID PANEL: CPT

## 2018-11-01 PROCEDURE — 80053 COMPREHEN METABOLIC PANEL: CPT

## 2018-11-01 PROCEDURE — 36415 COLL VENOUS BLD VENIPUNCTURE: CPT

## 2018-11-01 PROCEDURE — 82550 ASSAY OF CK (CPK): CPT

## 2018-11-01 PROCEDURE — 83735 ASSAY OF MAGNESIUM: CPT

## 2018-11-01 PROCEDURE — 80074 ACUTE HEPATITIS PANEL: CPT

## 2018-11-01 PROCEDURE — 87389 HIV-1 AG W/HIV-1&-2 AB AG IA: CPT

## 2018-11-01 PROCEDURE — 83690 ASSAY OF LIPASE: CPT

## 2018-11-02 ENCOUNTER — HOSPITAL ENCOUNTER (OUTPATIENT)
Age: 43
Discharge: HOME | End: 2018-11-02
Payer: MEDICARE

## 2018-11-02 ENCOUNTER — TELEPHONE (OUTPATIENT)
Dept: INTERNAL MEDICINE CLINIC | Age: 43
End: 2018-11-02

## 2018-11-02 DIAGNOSIS — F10.931 ALCOHOL WITHDRAWAL SYNDROME, WITH DELIRIUM (HCC): ICD-10-CM

## 2018-11-02 DIAGNOSIS — H35.60 RETINAL HEMORRHAGE, UNSPECIFIED LATERALITY: ICD-10-CM

## 2018-11-02 DIAGNOSIS — F10.10 ALCOHOL ABUSE: Primary | ICD-10-CM

## 2018-11-02 DIAGNOSIS — Z13.1 DIABETES MELLITUS SCREENING: ICD-10-CM

## 2018-11-02 LAB
AVERAGE GLUCOSE: 84 MG/DL (ref 70–126)
BASOPHILS # BLD: 2.7 %
BASOPHILS ABSOLUTE: 0.3 THOU/MM3 (ref 0–0.1)
EOSINOPHIL # BLD: 3.2 %
EOSINOPHILS ABSOLUTE: 0.3 THOU/MM3 (ref 0–0.4)
ERYTHROCYTE [DISTWIDTH] IN BLOOD BY AUTOMATED COUNT: 14 % (ref 11.5–14.5)
ERYTHROCYTE [DISTWIDTH] IN BLOOD BY AUTOMATED COUNT: 52.6 FL (ref 35–45)
HBA1C MFR BLD: 4.8 % (ref 4.4–6.4)
HCT VFR BLD CALC: 43.3 % (ref 42–52)
HEMOGLOBIN: 14.1 GM/DL (ref 14–18)
IMMATURE GRANS (ABS): 0.07 THOU/MM3 (ref 0–0.07)
IMMATURE GRANULOCYTES: 0.7 %
LYMPHOCYTES # BLD: 46.1 %
LYMPHOCYTES ABSOLUTE: 4.7 THOU/MM3 (ref 1–4.8)
MCH RBC QN AUTO: 33.3 PG (ref 26–33)
MCHC RBC AUTO-ENTMCNC: 32.6 GM/DL (ref 32.2–35.5)
MCV RBC AUTO: 102.4 FL (ref 80–94)
MONOCYTES # BLD: 9.9 %
MONOCYTES ABSOLUTE: 1 THOU/MM3 (ref 0.4–1.3)
NUCLEATED RED BLOOD CELLS: 0 /100 WBC
PLATELET # BLD: 374 THOU/MM3 (ref 130–400)
PMV BLD AUTO: 9.5 FL (ref 9.4–12.4)
RBC # BLD: 4.23 MILL/MM3 (ref 4.7–6.1)
SEG NEUTROPHILS: 37.4 %
SEGMENTED NEUTROPHILS ABSOLUTE COUNT: 3.8 THOU/MM3 (ref 1.8–7.7)
WBC # BLD: 10.2 THOU/MM3 (ref 4.8–10.8)

## 2018-11-02 PROCEDURE — 36415 COLL VENOUS BLD VENIPUNCTURE: CPT

## 2018-11-02 PROCEDURE — 83036 HEMOGLOBIN GLYCOSYLATED A1C: CPT

## 2018-11-02 PROCEDURE — 85025 COMPLETE CBC W/AUTO DIFF WBC: CPT

## 2018-11-03 LAB — HIV-2 AB: NEGATIVE

## 2018-11-05 ENCOUNTER — OFFICE VISIT (OUTPATIENT)
Dept: INTERNAL MEDICINE CLINIC | Age: 43
End: 2018-11-05
Payer: MEDICARE

## 2018-11-05 VITALS
HEIGHT: 71 IN | RESPIRATION RATE: 18 BRPM | OXYGEN SATURATION: 99 % | TEMPERATURE: 98.2 F | BODY MASS INDEX: 18.26 KG/M2 | SYSTOLIC BLOOD PRESSURE: 120 MMHG | HEART RATE: 62 BPM | WEIGHT: 130.4 LBS | DIASTOLIC BLOOD PRESSURE: 72 MMHG

## 2018-11-05 DIAGNOSIS — R90.89 ABNORMAL BRAIN MRI: ICD-10-CM

## 2018-11-05 DIAGNOSIS — H35.60 RETINAL HEMORRHAGE, UNSPECIFIED LATERALITY: ICD-10-CM

## 2018-11-05 DIAGNOSIS — R74.8 ELEVATED CK: ICD-10-CM

## 2018-11-05 DIAGNOSIS — E83.42 HYPOMAGNESEMIA: ICD-10-CM

## 2018-11-05 DIAGNOSIS — K59.00 CONSTIPATION, UNSPECIFIED CONSTIPATION TYPE: ICD-10-CM

## 2018-11-05 DIAGNOSIS — Z23 NEED FOR PNEUMOCOCCAL VACCINATION: ICD-10-CM

## 2018-11-05 DIAGNOSIS — K85.20 ALCOHOL-INDUCED ACUTE PANCREATITIS WITHOUT INFECTION OR NECROSIS: ICD-10-CM

## 2018-11-05 DIAGNOSIS — Z23 NEED FOR IMMUNIZATION AGAINST INFLUENZA: ICD-10-CM

## 2018-11-05 DIAGNOSIS — F10.11 HISTORY OF ALCOHOL ABUSE: Primary | ICD-10-CM

## 2018-11-05 PROBLEM — R41.0 DISORIENTATION: Status: RESOLVED | Noted: 2018-10-12 | Resolved: 2018-11-05

## 2018-11-05 PROCEDURE — G8482 FLU IMMUNIZE ORDER/ADMIN: HCPCS | Performed by: NURSE PRACTITIONER

## 2018-11-05 PROCEDURE — 90472 IMMUNIZATION ADMIN EACH ADD: CPT | Performed by: NURSE PRACTITIONER

## 2018-11-05 PROCEDURE — 90471 IMMUNIZATION ADMIN: CPT | Performed by: NURSE PRACTITIONER

## 2018-11-05 PROCEDURE — 1111F DSCHRG MED/CURRENT MED MERGE: CPT | Performed by: NURSE PRACTITIONER

## 2018-11-05 PROCEDURE — 90686 IIV4 VACC NO PRSV 0.5 ML IM: CPT | Performed by: NURSE PRACTITIONER

## 2018-11-05 PROCEDURE — 4004F PT TOBACCO SCREEN RCVD TLK: CPT | Performed by: NURSE PRACTITIONER

## 2018-11-05 PROCEDURE — G8419 CALC BMI OUT NRM PARAM NOF/U: HCPCS | Performed by: NURSE PRACTITIONER

## 2018-11-05 PROCEDURE — G8427 DOCREV CUR MEDS BY ELIG CLIN: HCPCS | Performed by: NURSE PRACTITIONER

## 2018-11-05 PROCEDURE — 99214 OFFICE O/P EST MOD 30 MIN: CPT | Performed by: NURSE PRACTITIONER

## 2018-11-05 PROCEDURE — 90732 PPSV23 VACC 2 YRS+ SUBQ/IM: CPT | Performed by: NURSE PRACTITIONER

## 2018-11-05 ASSESSMENT — ENCOUNTER SYMPTOMS
ABDOMINAL DISTENTION: 0
WHEEZING: 0
CHEST TIGHTNESS: 0
PHOTOPHOBIA: 0
EYE PAIN: 0
ABDOMINAL PAIN: 0
STRIDOR: 0
SHORTNESS OF BREATH: 0
COUGH: 0
CONSTIPATION: 1

## 2018-11-05 NOTE — PROGRESS NOTES
He has no rales. He exhibits no tenderness. Abdominal: Soft. He exhibits no distension and no mass. There is no tenderness. There is no guarding. Hyperactive BS x 4   Musculoskeletal: He exhibits no edema or tenderness. Neurological: He is alert and oriented to person, place, and time. No cranial nerve deficit (grossly intact CNII-XII). Skin: Skin is warm and dry. He is not diaphoretic. No erythema. Psychiatric: He has a normal mood and affect. Judgment normal.   Vitals reviewed.           Electronically signed by JACQUE Garcia CNP on 11/5/2018 at 2:40 PM

## 2018-11-06 ENCOUNTER — TELEPHONE (OUTPATIENT)
Dept: INTERNAL MEDICINE CLINIC | Age: 43
End: 2018-11-06

## 2018-11-08 ENCOUNTER — INITIAL CONSULT (OUTPATIENT)
Dept: NEUROLOGY | Age: 43
End: 2018-11-08
Payer: MEDICARE

## 2018-11-08 VITALS
BODY MASS INDEX: 18.62 KG/M2 | HEIGHT: 71 IN | SYSTOLIC BLOOD PRESSURE: 124 MMHG | HEART RATE: 66 BPM | DIASTOLIC BLOOD PRESSURE: 68 MMHG | WEIGHT: 133 LBS

## 2018-11-08 DIAGNOSIS — R56.9 ALCOHOL RELATED SEIZURE (HCC): Primary | ICD-10-CM

## 2018-11-08 PROCEDURE — G8427 DOCREV CUR MEDS BY ELIG CLIN: HCPCS | Performed by: PSYCHIATRY & NEUROLOGY

## 2018-11-08 PROCEDURE — 1111F DSCHRG MED/CURRENT MED MERGE: CPT | Performed by: PSYCHIATRY & NEUROLOGY

## 2018-11-08 PROCEDURE — G8482 FLU IMMUNIZE ORDER/ADMIN: HCPCS | Performed by: PSYCHIATRY & NEUROLOGY

## 2018-11-08 PROCEDURE — G8420 CALC BMI NORM PARAMETERS: HCPCS | Performed by: PSYCHIATRY & NEUROLOGY

## 2018-11-08 PROCEDURE — 99204 OFFICE O/P NEW MOD 45 MIN: CPT | Performed by: PSYCHIATRY & NEUROLOGY

## 2018-11-08 PROCEDURE — 4004F PT TOBACCO SCREEN RCVD TLK: CPT | Performed by: PSYCHIATRY & NEUROLOGY

## 2018-11-08 NOTE — PATIENT INSTRUCTIONS
1. EEG  2. Need to cut down on your alcohol intake. 3. No driving, swimming, operating heavy machinery or compromising heights until event free for 6 months. Report any new events. 4. Take B12 sublingual 3000 mcg daily. 5. Call with any new symptoms or concerns. 6. Follow up in 4 months.

## 2018-11-08 NOTE — PROGRESS NOTES
Acute pancreatitis 10/2018    Alcohol abuse     Hypertension     Pneumonia 10/2018    Retinal hemorrhage 10/2018    Rhabdomyolysis 10/2018    Seizures [facility]     Traumatic brain injury ([facility] 10/2018    fell down stairs while intoxicated       Patient Active Problem List   Diagnosis    Fall down stairs    Seizure-like activity (HCC)    Unspecified open wound of scalp, initial encounter    Closed head injury with concussion    Acute pancreatitis    Seizure ([facility]    Alcohol abuse    Severe malnutrition (HCC)    [facility] withdrawal [facility], [facility]ium [facility]    Hypomagnesemia    Gross hematuria    Abnormal brain MRI       No Known Allergies    Current Outpatient Prescriptions   Medication Sig Dispense Refill    Multiple Vitamins-Minerals (MULTIVITAMIN WITH MINERALS) tablet Take 1 tablet by mouth daily 30 tablet 3    vitamin B-1 (THIAMINE) 100 MG tablet Take 1 tablet by mouth daily 30 tablet 3    folic acid (FOLVITE) 1 MG tablet Take 1 tablet by mouth daily 30 tablet 3    docusate (COLACE, DULCOLAX) 100 MG CAPS Take 100 mg by mouth 2 times daily as needed (constipation) 30 capsule 0    magnesium oxide (MAG-OX) 400 MG tablet Take 1 tablet by mouth daily 30 tablet 0    nicotine (NICODERM CQ) 21 MG/24HR Place 1 patch onto the skin daily 14 patch 0     No current facility-administered medications for this visit.         Social History     Social History    Marital status: Single     Spouse name: N/A    Number of children: N/A    Years of education: N/A     Social History Main Topics    Smoking status: Current Every Day Smoker     Packs/day: 0.50     Types: Cigarettes    Smokeless tobacco: Never Used    Alcohol use No      Comment: 12 pack per week / currently not drinking     Drug use: No    Sexual activity: Not Asked     Other Topics Concern    None     Social History Narrative    None       Family History   Problem Relation Age of Onset    Coronary Art Dis Mother     High Blood Pressure

## 2018-11-24 ENCOUNTER — HOSPITAL ENCOUNTER (OUTPATIENT)
Age: 43
Discharge: HOME | End: 2018-11-24
Payer: MEDICARE

## 2018-11-24 LAB
BILIRUBIN URINE: NEGATIVE
BLOOD, URINE: NEGATIVE
CHARACTER, URINE: CLEAR
COLOR: YELLOW
GLUCOSE URINE: NEGATIVE MG/DL
KETONES, URINE: NEGATIVE
LEUKOCYTE ESTERASE, URINE: NEGATIVE
NITRITE, URINE: NEGATIVE
PH UA: 5.5
PROTEIN UA: NEGATIVE
SPECIFIC GRAVITY, URINE: 1.01 (ref 1–1.03)
UROBILINOGEN, URINE: 0.2 EU/DL

## 2018-11-24 PROCEDURE — 81003 URINALYSIS AUTO W/O SCOPE: CPT

## 2018-11-27 ENCOUNTER — OFFICE VISIT (OUTPATIENT)
Dept: FAMILY MEDICINE CLINIC | Age: 43
End: 2018-11-27
Payer: MEDICARE

## 2018-11-27 ENCOUNTER — TELEPHONE (OUTPATIENT)
Dept: FAMILY MEDICINE CLINIC | Age: 43
End: 2018-11-27

## 2018-11-27 VITALS
WEIGHT: 135.4 LBS | HEART RATE: 66 BPM | SYSTOLIC BLOOD PRESSURE: 139 MMHG | TEMPERATURE: 98.6 F | HEIGHT: 70 IN | DIASTOLIC BLOOD PRESSURE: 60 MMHG | BODY MASS INDEX: 19.39 KG/M2 | RESPIRATION RATE: 20 BRPM

## 2018-11-27 DIAGNOSIS — Z00.00 WELLNESS EXAMINATION: Primary | ICD-10-CM

## 2018-11-27 DIAGNOSIS — K59.00 CONSTIPATION, UNSPECIFIED CONSTIPATION TYPE: ICD-10-CM

## 2018-11-27 DIAGNOSIS — R90.89 ABNORMAL BRAIN MRI: ICD-10-CM

## 2018-11-27 DIAGNOSIS — H35.63 SUBRETINAL HEMORRHAGE OF BOTH EYES: ICD-10-CM

## 2018-11-27 DIAGNOSIS — F10.10 ALCOHOL ABUSE: ICD-10-CM

## 2018-11-27 DIAGNOSIS — Z23 NEED FOR TDAP VACCINATION: ICD-10-CM

## 2018-11-27 PROBLEM — S06.0XAA CLOSED HEAD INJURY WITH CONCUSSION: Status: RESOLVED | Noted: 2018-10-12 | Resolved: 2018-11-27

## 2018-11-27 PROBLEM — S05.8X2A: Status: ACTIVE | Noted: 2018-11-09

## 2018-11-27 PROBLEM — E43 SEVERE MALNUTRITION (HCC): Status: RESOLVED | Noted: 2018-10-13 | Resolved: 2018-11-27
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
## 2018-11-27 PROBLEM — S01.00XA UNSPECIFIED OPEN WOUND OF SCALP, INITIAL ENCOUNTER: Status: RESOLVED | Noted: 2018-10-12 | Resolved: 2018-11-27

## 2018-11-27 PROBLEM — H35.81 MACULAR EDEMA: Status: ACTIVE | Noted: 2018-11-09

## 2018-11-27 LAB
FOLATE: 19.9 NG/ML (ref 4.8–24.2)
VITAMIN B-12: 459 PG/ML (ref 211–911)

## 2018-11-27 PROCEDURE — 36415 COLL VENOUS BLD VENIPUNCTURE: CPT | Performed by: NURSE PRACTITIONER

## 2018-11-27 PROCEDURE — 90471 IMMUNIZATION ADMIN: CPT | Performed by: NURSE PRACTITIONER

## 2018-11-27 PROCEDURE — 99396 PREV VISIT EST AGE 40-64: CPT | Performed by: NURSE PRACTITIONER

## 2018-11-27 PROCEDURE — 90715 TDAP VACCINE 7 YRS/> IM: CPT | Performed by: NURSE PRACTITIONER

## 2018-11-27 PROCEDURE — G8482 FLU IMMUNIZE ORDER/ADMIN: HCPCS | Performed by: NURSE PRACTITIONER

## 2018-11-27 RX ORDER — PREDNISOLONE ACETATE 10 MG/ML
SUSPENSION/ DROPS OPHTHALMIC
COMMUNITY
Start: 2018-11-19 | End: 2019-01-30 | Stop reason: ALTCHOICE

## 2018-11-27 ASSESSMENT — ENCOUNTER SYMPTOMS
COUGH: 0
BACK PAIN: 0
SHORTNESS OF BREATH: 0
SORE THROAT: 0
STRIDOR: 0
NAUSEA: 0
ABDOMINAL PAIN: 0
CONSTIPATION: 0
COLOR CHANGE: 0
DIARRHEA: 0
RHINORRHEA: 0
EYE PAIN: 0
VOMITING: 0
EYE DISCHARGE: 0
WHEEZING: 0

## 2018-11-27 NOTE — TELEPHONE ENCOUNTER
----- Message from Bijal Prado DO sent at 11/26/2018  9:30 PM EST -----  The urinalysis was clear this time

## 2018-11-27 NOTE — PROGRESS NOTES
Venipuncture obtained from right arm. Patient tolerated the procedure without complication or complaint.        Immunizations     Name Date Dose Route    Tdap (Boostrix, Adacel) 11/27/2018 0.5 mL Intramuscular    Site: Deltoid- Right    Lot: Hussein Corea    NDC: 21292-701-83
The 10-year ASCVD risk score (Linda Camara, et al., 2013) is: 5.1%    Values used to calculate the score:      Age: 39 years      Sex: Male      Is Non- : No      Diabetic: No      Tobacco smoker: Yes      Systolic Blood Pressure: 616 mmHg      Is BP treated: No      HDL Cholesterol: 53 mg/dL      Total Cholesterol: 194 mg/dL    Lab Results   Component Value Date     11/01/2018    K 4.2 11/01/2018     11/01/2018    CO2 21 (L) 11/01/2018    BUN 9 11/01/2018    CREATININE 0.5 11/01/2018    GLUCOSE 78 11/01/2018    CALCIUM 9.3 11/01/2018    PROT 6.9 11/01/2018    LABALBU 4.0 11/01/2018    BILITOT 0.5 11/01/2018    ALKPHOS 53 11/01/2018    AST 27 11/01/2018    ALT 18 11/01/2018    LABGLOM >90 11/01/2018       No results found for: Jaky Wren    No results found for: TSH, V1XLSWD, R1QTXTG, THYROIDAB    Lab Results   Component Value Date    WBC 10.2 11/02/2018    HGB 14.1 11/02/2018    HCT 43.3 11/02/2018    .4 (H) 11/02/2018     11/02/2018       No results found for: PSA, PSADIA    Immunization History   Administered Date(s) Administered    Influenza, Quadv, 3 yrs and older, IM, PF (Fluzone 3 yrs and older or Afluria 5 yrs and older) 11/05/2018    Pneumococcal Polysaccharide ([id number]) 11/05/2018       Health Maintenance   Topic Date Due    DTaP/Tdap/Td vaccine (1 - Tdap) 01/20/1993    Lipid screen  11/01/2023    Flu vaccine  Completed    Pneumococcal med risk  Completed    HIV screen  Completed       Future Appointments  Date Time Provider Sharath Timmonsi   3/11/2019 10:30 AM Cherrie Serrano MD [address] [facility] [address]       ASSESSMENT      Abdomen is soft and flat with normal bowel sounds, no tenderness noted. Scalp wound areas are healing well with no signs of irritation or infection. Patient's affect and attitude are bright and optimistic, in spite of his visual issue. He is hoping to get back to work.   He is encouraged to give time for

## 2018-12-11 LAB — VITAMIN D 1,25-DIHYDROXY: NORMAL

## 2018-12-14 ENCOUNTER — HOSPITAL ENCOUNTER (OUTPATIENT)
Dept: NEUROLOGY | Age: 43
Discharge: HOME | End: 2018-12-14
Payer: MEDICARE

## 2018-12-14 PROCEDURE — 95819 EEG AWAKE AND ASLEEP: CPT

## 2019-01-02 ENCOUNTER — OFFICE VISIT (OUTPATIENT)
Dept: FAMILY MEDICINE CLINIC | Age: 44
End: 2019-01-02
Payer: MEDICARE

## 2019-01-02 VITALS
BODY MASS INDEX: 20.36 KG/M2 | TEMPERATURE: 98.5 F | DIASTOLIC BLOOD PRESSURE: 74 MMHG | HEIGHT: 70 IN | HEART RATE: 80 BPM | RESPIRATION RATE: 18 BRPM | WEIGHT: 142.2 LBS | SYSTOLIC BLOOD PRESSURE: 112 MMHG

## 2019-01-02 DIAGNOSIS — F41.9 ANXIETY: ICD-10-CM

## 2019-01-02 DIAGNOSIS — H35.63 SUBRETINAL HEMORRHAGE OF BOTH EYES: Primary | ICD-10-CM

## 2019-01-02 PROCEDURE — G8427 DOCREV CUR MEDS BY ELIG CLIN: HCPCS | Performed by: NURSE PRACTITIONER

## 2019-01-02 PROCEDURE — 4004F PT TOBACCO SCREEN RCVD TLK: CPT | Performed by: NURSE PRACTITIONER

## 2019-01-02 PROCEDURE — G8482 FLU IMMUNIZE ORDER/ADMIN: HCPCS | Performed by: NURSE PRACTITIONER

## 2019-01-02 PROCEDURE — 99214 OFFICE O/P EST MOD 30 MIN: CPT | Performed by: NURSE PRACTITIONER

## 2019-01-02 PROCEDURE — G8420 CALC BMI NORM PARAMETERS: HCPCS | Performed by: NURSE PRACTITIONER

## 2019-01-02 RX ORDER — LORAZEPAM 0.5 MG/1
0.5 TABLET ORAL NIGHTLY PRN
Qty: 15 TABLET | Refills: 0 | Status: SHIPPED | OUTPATIENT
Start: 2019-01-02 | End: 2019-01-07 | Stop reason: CLARIF

## 2019-01-02 ASSESSMENT — ENCOUNTER SYMPTOMS
STRIDOR: 0
COUGH: 0
SORE THROAT: 0
EYE DISCHARGE: 0
CONSTIPATION: 0
WHEEZING: 0
EYE PAIN: 0
ABDOMINAL PAIN: 0
VOMITING: 0
DIARRHEA: 0
SHORTNESS OF BREATH: 0
BACK PAIN: 0
RHINORRHEA: 0
COLOR CHANGE: 0
NAUSEA: 0

## 2019-01-07 DIAGNOSIS — F41.9 ANXIETY: ICD-10-CM

## 2019-01-07 RX ORDER — LORAZEPAM 0.5 MG/1
0.5 TABLET ORAL NIGHTLY PRN
Qty: 15 TABLET | Refills: 0 | Status: SHIPPED | OUTPATIENT
Start: 2019-01-07 | End: 2019-01-22

## 2019-01-30 ENCOUNTER — OFFICE VISIT (OUTPATIENT)
Dept: FAMILY MEDICINE CLINIC | Age: 44
End: 2019-01-30
Payer: MEDICARE

## 2019-01-30 VITALS
TEMPERATURE: 98.2 F | RESPIRATION RATE: 16 BRPM | WEIGHT: 143.6 LBS | SYSTOLIC BLOOD PRESSURE: 103 MMHG | BODY MASS INDEX: 20.6 KG/M2 | DIASTOLIC BLOOD PRESSURE: 62 MMHG | HEART RATE: 72 BPM

## 2019-01-30 DIAGNOSIS — G47.00 INSOMNIA, UNSPECIFIED TYPE: Primary | ICD-10-CM

## 2019-01-30 DIAGNOSIS — F41.9 ANXIETY: ICD-10-CM

## 2019-01-30 DIAGNOSIS — H35.63 SUBRETINAL HEMORRHAGE OF BOTH EYES: ICD-10-CM

## 2019-01-30 PROBLEM — R56.9 SEIZURE-LIKE ACTIVITY (HCC): Status: RESOLVED | Noted: 2018-10-12 | Resolved: 2019-01-30

## 2019-01-30 PROCEDURE — 99213 OFFICE O/P EST LOW 20 MIN: CPT | Performed by: NURSE PRACTITIONER

## 2019-01-30 PROCEDURE — G8482 FLU IMMUNIZE ORDER/ADMIN: HCPCS | Performed by: NURSE PRACTITIONER

## 2019-01-30 PROCEDURE — 4004F PT TOBACCO SCREEN RCVD TLK: CPT | Performed by: NURSE PRACTITIONER

## 2019-01-30 PROCEDURE — G8427 DOCREV CUR MEDS BY ELIG CLIN: HCPCS | Performed by: NURSE PRACTITIONER

## 2019-01-30 PROCEDURE — G8420 CALC BMI NORM PARAMETERS: HCPCS | Performed by: NURSE PRACTITIONER

## 2019-01-30 ASSESSMENT — ENCOUNTER SYMPTOMS
COLOR CHANGE: 0
CONSTIPATION: 0
BACK PAIN: 0
VOMITING: 0
EYE DISCHARGE: 0
COUGH: 0
STRIDOR: 0
EYE PAIN: 0
ABDOMINAL PAIN: 0
RHINORRHEA: 0
SORE THROAT: 0
SHORTNESS OF BREATH: 0
NAUSEA: 0
DIARRHEA: 0
WHEEZING: 0

## 2019-01-31 DIAGNOSIS — G47.00 INSOMNIA, UNSPECIFIED TYPE: Primary | ICD-10-CM

## 2019-01-31 RX ORDER — TRAZODONE HYDROCHLORIDE 50 MG/1
50 TABLET ORAL NIGHTLY PRN
Qty: 30 TABLET | Refills: 0 | Status: SHIPPED | OUTPATIENT
Start: 2019-01-31 | End: 2019-03-05 | Stop reason: ALTCHOICE

## 2019-02-01 DIAGNOSIS — G47.01 INSOMNIA DUE TO MEDICAL CONDITION: Primary | ICD-10-CM

## 2019-02-04 RX ORDER — TEMAZEPAM 15 MG/1
15 CAPSULE ORAL NIGHTLY PRN
Qty: 14 CAPSULE | Refills: 0 | Status: SHIPPED | OUTPATIENT
Start: 2019-02-04 | End: 2019-02-18

## 2019-03-05 ENCOUNTER — OFFICE VISIT (OUTPATIENT)
Dept: FAMILY MEDICINE CLINIC | Age: 44
End: 2019-03-05
Payer: MEDICARE

## 2019-03-05 VITALS
DIASTOLIC BLOOD PRESSURE: 68 MMHG | HEART RATE: 66 BPM | BODY MASS INDEX: 19.97 KG/M2 | WEIGHT: 139.2 LBS | RESPIRATION RATE: 16 BRPM | SYSTOLIC BLOOD PRESSURE: 118 MMHG

## 2019-03-05 DIAGNOSIS — H54.10 BLINDNESS AND LOW VISION: Primary | ICD-10-CM

## 2019-03-05 DIAGNOSIS — G47.01 INSOMNIA DUE TO MEDICAL CONDITION: ICD-10-CM

## 2019-03-05 DIAGNOSIS — F10.11 HISTORY OF ALCOHOL ABUSE: ICD-10-CM

## 2019-03-05 PROBLEM — H35.63: Status: RESOLVED | Noted: 2018-11-09 | Resolved: 2019-03-05

## 2019-03-05 PROBLEM — R90.89 ABNORMAL BRAIN MRI: Status: RESOLVED | Noted: 2018-11-05 | Resolved: 2019-03-05

## 2019-03-05 PROCEDURE — G8427 DOCREV CUR MEDS BY ELIG CLIN: HCPCS | Performed by: NURSE PRACTITIONER

## 2019-03-05 PROCEDURE — 4004F PT TOBACCO SCREEN RCVD TLK: CPT | Performed by: NURSE PRACTITIONER

## 2019-03-05 PROCEDURE — G8420 CALC BMI NORM PARAMETERS: HCPCS | Performed by: NURSE PRACTITIONER

## 2019-03-05 PROCEDURE — 99214 OFFICE O/P EST MOD 30 MIN: CPT | Performed by: NURSE PRACTITIONER

## 2019-03-05 PROCEDURE — G8482 FLU IMMUNIZE ORDER/ADMIN: HCPCS | Performed by: NURSE PRACTITIONER

## 2019-03-05 RX ORDER — TRAZODONE HYDROCHLORIDE 100 MG/1
100 TABLET ORAL NIGHTLY
Qty: 90 TABLET | Refills: 1 | Status: SHIPPED | OUTPATIENT
Start: 2019-03-05 | End: 2019-09-29

## 2019-03-05 ASSESSMENT — PATIENT HEALTH QUESTIONNAIRE - PHQ9
SUM OF ALL RESPONSES TO PHQ QUESTIONS 1-9: 0
2. FEELING DOWN, DEPRESSED OR HOPELESS: 0
SUM OF ALL RESPONSES TO PHQ9 QUESTIONS 1 & 2: 0
SUM OF ALL RESPONSES TO PHQ QUESTIONS 1-9: 0
1. LITTLE INTEREST OR PLEASURE IN DOING THINGS: 0

## 2019-03-05 ASSESSMENT — ENCOUNTER SYMPTOMS
DIARRHEA: 0
CONSTIPATION: 0
BACK PAIN: 0
WHEEZING: 0
SHORTNESS OF BREATH: 0
RHINORRHEA: 0
COUGH: 0
VOMITING: 0
EYE DISCHARGE: 0
STRIDOR: 0
ABDOMINAL PAIN: 0
NAUSEA: 0
SORE THROAT: 0
COLOR CHANGE: 0
EYE PAIN: 0

## 2019-08-23 ENCOUNTER — HOSPITAL ENCOUNTER (INPATIENT)
Age: 44
LOS: 6 days | Discharge: HOME | End: 2019-08-29
Attending: INTERNAL MEDICINE | Admitting: INTERNAL MEDICINE
Payer: MEDICARE

## 2019-08-23 PROBLEM — D61.818 PANCYTOPENIA (HCC): Status: ACTIVE | Noted: 2019-08-23

## 2019-08-23 PROBLEM — D69.59 THROMBOCYTOPENIA CONCURRENT WITH AND DUE TO ALCOHOLISM (HCC): Status: ACTIVE | Noted: 2019-08-23

## 2019-08-23 PROBLEM — F10.20 THROMBOCYTOPENIA CONCURRENT WITH AND DUE TO ALCOHOLISM (HCC): Status: ACTIVE | Noted: 2019-08-23

## 2019-08-23 PROBLEM — K52.9 DIARRHEA DUE TO ALCOHOL INTAKE: Status: ACTIVE | Noted: 2019-08-23

## 2019-08-23 PROBLEM — E87.6 HYPOKALEMIA: Status: ACTIVE | Noted: 2019-08-23

## 2019-08-23 LAB
ALBUMIN SERPL-MCNC: 3.6 G/DL (ref 3.5–5.1)
ALP BLD-CCNC: 240 U/L (ref 38–126)
ALT SERPL-CCNC: 46 U/L (ref 11–66)
ANION GAP SERPL CALCULATED.3IONS-SCNC: 12 MEQ/L (ref 8–16)
APTT: 21.4 SECONDS (ref 22–38)
AST SERPL-CCNC: 128 U/L (ref 5–40)
BILIRUB SERPL-MCNC: 1.1 MG/DL (ref 0.3–1.2)
BILIRUBIN DIRECT: 0.3 MG/DL (ref 0–0.3)
BUN BLDV-MCNC: 6 MG/DL (ref 7–22)
CALCIUM SERPL-MCNC: 8.2 MG/DL (ref 8.5–10.5)
CHLORIDE BLD-SCNC: 92 MEQ/L (ref 98–111)
CO2: 28 MEQ/L (ref 23–33)
CREAT SERPL-MCNC: 0.5 MG/DL (ref 0.4–1.2)
ETHYL ALCOHOL, SERUM: < 0.01 %
GFR SERPL CREATININE-BSD FRML MDRD: > 90 ML/MIN/1.73M2
GLUCOSE BLD-MCNC: 105 MG/DL (ref 70–108)
INR BLD: 1 (ref 0.85–1.13)
MAGNESIUM: 1.3 MG/DL (ref 1.6–2.4)
POTASSIUM SERPL-SCNC: 2.9 MEQ/L (ref 3.5–5.2)
SCAN OF BLOOD SMEAR: NORMAL
SODIUM BLD-SCNC: 132 MEQ/L (ref 135–145)
TOTAL PROTEIN: 5.8 G/DL (ref 6.1–8)

## 2019-08-23 PROCEDURE — 80053 COMPREHEN METABOLIC PANEL: CPT

## 2019-08-23 PROCEDURE — 36415 COLL VENOUS BLD VENIPUNCTURE: CPT

## 2019-08-23 PROCEDURE — 1200000000 HC SEMI PRIVATE

## 2019-08-23 PROCEDURE — 82248 BILIRUBIN DIRECT: CPT

## 2019-08-23 PROCEDURE — 85025 COMPLETE CBC W/AUTO DIFF WBC: CPT

## 2019-08-23 PROCEDURE — G0480 DRUG TEST DEF 1-7 CLASSES: HCPCS

## 2019-08-23 PROCEDURE — 87040 BLOOD CULTURE FOR BACTERIA: CPT

## 2019-08-23 PROCEDURE — 6370000000 HC RX 637 (ALT 250 FOR IP): Performed by: INTERNAL MEDICINE

## 2019-08-23 PROCEDURE — 99999 PR OFFICE/OUTPT VISIT,PROCEDURE ONLY: CPT | Performed by: INTERNAL MEDICINE

## 2019-08-23 PROCEDURE — 83735 ASSAY OF MAGNESIUM: CPT

## 2019-08-23 PROCEDURE — 1200000003 HC TELEMETRY R&B

## 2019-08-23 PROCEDURE — 85610 PROTHROMBIN TIME: CPT

## 2019-08-23 PROCEDURE — 2709999900 HC NON-CHARGEABLE SUPPLY

## 2019-08-23 PROCEDURE — 85730 THROMBOPLASTIN TIME PARTIAL: CPT

## 2019-08-23 PROCEDURE — 6360000002 HC RX W HCPCS: Performed by: INTERNAL MEDICINE

## 2019-08-23 PROCEDURE — 2580000003 HC RX 258: Performed by: INTERNAL MEDICINE

## 2019-08-23 RX ORDER — POTASSIUM CHLORIDE 7.45 MG/ML
10 INJECTION INTRAVENOUS PRN
Status: DISCONTINUED | OUTPATIENT
Start: 2019-08-23 | End: 2019-08-29 | Stop reason: HOSPADM

## 2019-08-23 RX ORDER — MULTIVITAMIN WITH FOLIC ACID 400 MCG
1 TABLET ORAL DAILY
Status: DISCONTINUED | OUTPATIENT
Start: 2019-08-23 | End: 2019-08-29 | Stop reason: HOSPADM

## 2019-08-23 RX ORDER — LORAZEPAM 2 MG/ML
4 INJECTION INTRAMUSCULAR
Status: DISCONTINUED | OUTPATIENT
Start: 2019-08-23 | End: 2019-08-26

## 2019-08-23 RX ORDER — SODIUM CHLORIDE 9 MG/ML
INJECTION, SOLUTION INTRAVENOUS CONTINUOUS
Status: DISCONTINUED | OUTPATIENT
Start: 2019-08-23 | End: 2019-08-28

## 2019-08-23 RX ORDER — SODIUM CHLORIDE 0.9 % (FLUSH) 0.9 %
10 SYRINGE (ML) INJECTION PRN
Status: DISCONTINUED | OUTPATIENT
Start: 2019-08-23 | End: 2019-08-29 | Stop reason: HOSPADM

## 2019-08-23 RX ORDER — ONDANSETRON 2 MG/ML
4 INJECTION INTRAMUSCULAR; INTRAVENOUS EVERY 6 HOURS PRN
Status: DISCONTINUED | OUTPATIENT
Start: 2019-08-23 | End: 2019-08-29 | Stop reason: HOSPADM

## 2019-08-23 RX ORDER — LORAZEPAM 2 MG/ML
2 INJECTION INTRAMUSCULAR
Status: DISCONTINUED | OUTPATIENT
Start: 2019-08-23 | End: 2019-08-26

## 2019-08-23 RX ORDER — LORAZEPAM 1 MG/1
1 TABLET ORAL
Status: DISCONTINUED | OUTPATIENT
Start: 2019-08-23 | End: 2019-08-26

## 2019-08-23 RX ORDER — LORAZEPAM 2 MG/1
4 TABLET ORAL
Status: DISCONTINUED | OUTPATIENT
Start: 2019-08-23 | End: 2019-08-26

## 2019-08-23 RX ORDER — THIAMINE MONONITRATE (VIT B1) 100 MG
100 TABLET ORAL DAILY
Status: DISCONTINUED | OUTPATIENT
Start: 2019-08-23 | End: 2019-08-29 | Stop reason: HOSPADM

## 2019-08-23 RX ORDER — LORAZEPAM 2 MG/ML
3 INJECTION INTRAMUSCULAR
Status: DISCONTINUED | OUTPATIENT
Start: 2019-08-23 | End: 2019-08-26

## 2019-08-23 RX ORDER — SODIUM CHLORIDE 0.9 % (FLUSH) 0.9 %
10 SYRINGE (ML) INJECTION EVERY 12 HOURS SCHEDULED
Status: DISCONTINUED | OUTPATIENT
Start: 2019-08-23 | End: 2019-08-29 | Stop reason: HOSPADM

## 2019-08-23 RX ORDER — FAMOTIDINE 20 MG/1
20 TABLET, FILM COATED ORAL 2 TIMES DAILY
Status: DISCONTINUED | OUTPATIENT
Start: 2019-08-23 | End: 2019-08-29 | Stop reason: HOSPADM

## 2019-08-23 RX ORDER — POTASSIUM CHLORIDE 20 MEQ/1
40 TABLET, EXTENDED RELEASE ORAL PRN
Status: DISCONTINUED | OUTPATIENT
Start: 2019-08-23 | End: 2019-08-29 | Stop reason: HOSPADM

## 2019-08-23 RX ORDER — LORAZEPAM 2 MG/1
2 TABLET ORAL
Status: DISCONTINUED | OUTPATIENT
Start: 2019-08-23 | End: 2019-08-26

## 2019-08-23 RX ORDER — LORAZEPAM 2 MG/ML
1 INJECTION INTRAMUSCULAR
Status: DISCONTINUED | OUTPATIENT
Start: 2019-08-23 | End: 2019-08-26

## 2019-08-23 RX ORDER — POTASSIUM CHLORIDE 7.45 MG/ML
10 INJECTION INTRAVENOUS
Status: COMPLETED | OUTPATIENT
Start: 2019-08-23 | End: 2019-08-24

## 2019-08-23 RX ORDER — MAGNESIUM SULFATE IN WATER 40 MG/ML
4 INJECTION, SOLUTION INTRAVENOUS ONCE
Status: COMPLETED | OUTPATIENT
Start: 2019-08-23 | End: 2019-08-24

## 2019-08-23 RX ORDER — FOLIC ACID 1 MG/1
1 TABLET ORAL DAILY
Status: DISCONTINUED | OUTPATIENT
Start: 2019-08-23 | End: 2019-08-29 | Stop reason: HOSPADM

## 2019-08-23 RX ORDER — ACETAMINOPHEN 325 MG/1
650 TABLET ORAL EVERY 4 HOURS PRN
Status: DISCONTINUED | OUTPATIENT
Start: 2019-08-23 | End: 2019-08-29 | Stop reason: HOSPADM

## 2019-08-23 RX ADMIN — LORAZEPAM 3 MG: 2 INJECTION INTRAMUSCULAR; INTRAVENOUS at 19:08

## 2019-08-23 RX ADMIN — SODIUM CHLORIDE: 9 INJECTION, SOLUTION INTRAVENOUS at 17:29

## 2019-08-23 RX ADMIN — LORAZEPAM 2 MG: 2 INJECTION INTRAMUSCULAR; INTRAVENOUS at 21:05

## 2019-08-23 RX ADMIN — THERA TABS 1 TABLET: TAB at 21:05

## 2019-08-23 RX ADMIN — MAGNESIUM SULFATE HEPTAHYDRATE 4 G: 40 INJECTION, SOLUTION INTRAVENOUS at 23:26

## 2019-08-23 RX ADMIN — POTASSIUM CHLORIDE 10 MEQ: 7.46 INJECTION, SOLUTION INTRAVENOUS at 21:04

## 2019-08-23 RX ADMIN — LORAZEPAM 3 MG: 2 INJECTION INTRAMUSCULAR; INTRAVENOUS at 17:29

## 2019-08-23 RX ADMIN — Medication 100 MG: at 21:05

## 2019-08-23 RX ADMIN — POTASSIUM CHLORIDE 10 MEQ: 7.46 INJECTION, SOLUTION INTRAVENOUS at 23:00

## 2019-08-23 RX ADMIN — FAMOTIDINE 20 MG: 20 TABLET ORAL at 21:04

## 2019-08-23 RX ADMIN — FOLIC ACID 1 MG: 1 TABLET ORAL at 21:05

## 2019-08-23 ASSESSMENT — PAIN SCALES - GENERAL: PAINLEVEL_OUTOF10: 0

## 2019-08-24 ENCOUNTER — APPOINTMENT (OUTPATIENT)
Dept: ULTRASOUND IMAGING | Age: 44
End: 2019-08-24
Attending: INTERNAL MEDICINE
Payer: MEDICARE

## 2019-08-24 LAB
ALBUMIN SERPL-MCNC: 3.4 G/DL (ref 3.5–5.1)
ALP BLD-CCNC: 225 U/L (ref 38–126)
ALT SERPL-CCNC: 39 U/L (ref 11–66)
AMMONIA: 54 UMOL/L (ref 11–60)
ANION GAP SERPL CALCULATED.3IONS-SCNC: 14 MEQ/L (ref 8–16)
APTT: 28.3 SECONDS (ref 22–38)
AST SERPL-CCNC: 118 U/L (ref 5–40)
AVERAGE GLUCOSE: 108 MG/DL (ref 70–126)
BASOPHILS # BLD: 0.5 %
BASOPHILS # BLD: 0.7 %
BASOPHILS ABSOLUTE: 0 THOU/MM3 (ref 0–0.1)
BASOPHILS ABSOLUTE: 0 THOU/MM3 (ref 0–0.1)
BILIRUB SERPL-MCNC: 1.1 MG/DL (ref 0.3–1.2)
BILIRUBIN DIRECT: < 0.2 MG/DL (ref 0–0.3)
BUN BLDV-MCNC: 6 MG/DL (ref 7–22)
CALCIUM IONIZED: 0.93 MMOL/L (ref 1.12–1.32)
CALCIUM SERPL-MCNC: 8.3 MG/DL (ref 8.5–10.5)
CHLORIDE BLD-SCNC: 96 MEQ/L (ref 98–111)
CO2: 25 MEQ/L (ref 23–33)
CREAT SERPL-MCNC: 0.4 MG/DL (ref 0.4–1.2)
EOSINOPHIL # BLD: 0.3 %
EOSINOPHIL # BLD: 0.3 %
EOSINOPHILS ABSOLUTE: 0 THOU/MM3 (ref 0–0.4)
EOSINOPHILS ABSOLUTE: 0 THOU/MM3 (ref 0–0.4)
ERYTHROCYTE [DISTWIDTH] IN BLOOD BY AUTOMATED COUNT: 13.1 % (ref 11.5–14.5)
ERYTHROCYTE [DISTWIDTH] IN BLOOD BY AUTOMATED COUNT: 13.2 % (ref 11.5–14.5)
ERYTHROCYTE [DISTWIDTH] IN BLOOD BY AUTOMATED COUNT: 43.9 FL (ref 35–45)
ERYTHROCYTE [DISTWIDTH] IN BLOOD BY AUTOMATED COUNT: 45.6 FL (ref 35–45)
GFR SERPL CREATININE-BSD FRML MDRD: > 90 ML/MIN/1.73M2
GLUCOSE BLD-MCNC: 89 MG/DL (ref 70–108)
HBA1C MFR BLD: 5.6 % (ref 4.4–6.4)
HCT VFR BLD CALC: 38.4 % (ref 42–52)
HCT VFR BLD CALC: 40.8 % (ref 42–52)
HEMOGLOBIN: 13.7 GM/DL (ref 14–18)
HEMOGLOBIN: 14.3 GM/DL (ref 14–18)
IMMATURE GRANS (ABS): 0.02 THOU/MM3 (ref 0–0.07)
IMMATURE GRANS (ABS): 0.03 THOU/MM3 (ref 0–0.07)
IMMATURE GRANULOCYTES: 0 %
IMMATURE GRANULOCYTES: 1 %
INR BLD: 0.95 (ref 0.85–1.13)
LIPASE: 130.9 U/L (ref 5.6–51.3)
LYMPHOCYTES # BLD: 19.3 %
LYMPHOCYTES # BLD: 28.3 %
LYMPHOCYTES ABSOLUTE: 1.2 THOU/MM3 (ref 1–4.8)
LYMPHOCYTES ABSOLUTE: 1.8 THOU/MM3 (ref 1–4.8)
MAGNESIUM: 2 MG/DL (ref 1.6–2.4)
MAGNESIUM: 2.3 MG/DL (ref 1.6–2.4)
MCH RBC QN AUTO: 32.9 PG (ref 26–33)
MCH RBC QN AUTO: 33.4 PG (ref 26–33)
MCHC RBC AUTO-ENTMCNC: 35 GM/DL (ref 32.2–35.5)
MCHC RBC AUTO-ENTMCNC: 35.7 GM/DL (ref 32.2–35.5)
MCV RBC AUTO: 92.3 FL (ref 80–94)
MCV RBC AUTO: 95.3 FL (ref 80–94)
MONOCYTES # BLD: 5.7 %
MONOCYTES # BLD: 6.4 %
MONOCYTES ABSOLUTE: 0.4 THOU/MM3 (ref 0.4–1.3)
MONOCYTES ABSOLUTE: 0.4 THOU/MM3 (ref 0.4–1.3)
NUCLEATED RED BLOOD CELLS: 0 /100 WBC
NUCLEATED RED BLOOD CELLS: 0 /100 WBC
PATHOLOGIST REVIEW: ABNORMAL
PHOSPHORUS: 2.2 MG/DL (ref 2.4–4.7)
PLATELET # BLD: 23 THOU/MM3 (ref 130–400)
PLATELET # BLD: 28 THOU/MM3 (ref 130–400)
PLATELET ESTIMATE: ABNORMAL
PLATELET ESTIMATE: ABNORMAL
PMV BLD AUTO: 12 FL (ref 9.4–12.4)
PMV BLD AUTO: 12.1 FL (ref 9.4–12.4)
POTASSIUM REFLEX MAGNESIUM: 3.5 MEQ/L (ref 3.5–5.2)
POTASSIUM SERPL-SCNC: 3.2 MEQ/L (ref 3.5–5.2)
RBC # BLD: 4.16 MILL/MM3 (ref 4.7–6.1)
RBC # BLD: 4.28 MILL/MM3 (ref 4.7–6.1)
SCAN OF BLOOD SMEAR: NORMAL
SEG NEUTROPHILS: 64.9 %
SEG NEUTROPHILS: 72.8 %
SEGMENTED NEUTROPHILS ABSOLUTE COUNT: 4.1 THOU/MM3 (ref 1.8–7.7)
SEGMENTED NEUTROPHILS ABSOLUTE COUNT: 4.4 THOU/MM3 (ref 1.8–7.7)
SODIUM BLD-SCNC: 135 MEQ/L (ref 135–145)
TOTAL PROTEIN: 5.6 G/DL (ref 6.1–8)
TSH SERPL DL<=0.05 MIU/L-ACNC: 2.49 UIU/ML (ref 0.4–4.2)
WBC # BLD: 6.1 THOU/MM3 (ref 4.8–10.8)
WBC # BLD: 6.3 THOU/MM3 (ref 4.8–10.8)

## 2019-08-24 PROCEDURE — 82330 ASSAY OF CALCIUM: CPT

## 2019-08-24 PROCEDURE — 84443 ASSAY THYROID STIM HORMONE: CPT

## 2019-08-24 PROCEDURE — 80076 HEPATIC FUNCTION PANEL: CPT

## 2019-08-24 PROCEDURE — 6370000000 HC RX 637 (ALT 250 FOR IP): Performed by: INTERNAL MEDICINE

## 2019-08-24 PROCEDURE — 90792 PSYCH DIAG EVAL W/MED SRVCS: CPT | Performed by: PSYCHIATRY & NEUROLOGY

## 2019-08-24 PROCEDURE — 99233 SBSQ HOSP IP/OBS HIGH 50: CPT | Performed by: INTERNAL MEDICINE

## 2019-08-24 PROCEDURE — 82140 ASSAY OF AMMONIA: CPT

## 2019-08-24 PROCEDURE — 84100 ASSAY OF PHOSPHORUS: CPT

## 2019-08-24 PROCEDURE — 36415 COLL VENOUS BLD VENIPUNCTURE: CPT

## 2019-08-24 PROCEDURE — 83036 HEMOGLOBIN GLYCOSYLATED A1C: CPT

## 2019-08-24 PROCEDURE — 76705 ECHO EXAM OF ABDOMEN: CPT

## 2019-08-24 PROCEDURE — 6370000000 HC RX 637 (ALT 250 FOR IP): Performed by: PHYSICIAN ASSISTANT

## 2019-08-24 PROCEDURE — 80048 BASIC METABOLIC PNL TOTAL CA: CPT

## 2019-08-24 PROCEDURE — 85025 COMPLETE CBC W/AUTO DIFF WBC: CPT

## 2019-08-24 PROCEDURE — 2709999900 HC NON-CHARGEABLE SUPPLY

## 2019-08-24 PROCEDURE — 83735 ASSAY OF MAGNESIUM: CPT

## 2019-08-24 PROCEDURE — 6360000002 HC RX W HCPCS: Performed by: INTERNAL MEDICINE

## 2019-08-24 PROCEDURE — 84132 ASSAY OF SERUM POTASSIUM: CPT

## 2019-08-24 PROCEDURE — 1200000000 HC SEMI PRIVATE

## 2019-08-24 PROCEDURE — 85730 THROMBOPLASTIN TIME PARTIAL: CPT

## 2019-08-24 PROCEDURE — 2580000003 HC RX 258: Performed by: INTERNAL MEDICINE

## 2019-08-24 PROCEDURE — 85610 PROTHROMBIN TIME: CPT

## 2019-08-24 PROCEDURE — 1200000003 HC TELEMETRY R&B

## 2019-08-24 PROCEDURE — 83690 ASSAY OF LIPASE: CPT

## 2019-08-24 RX ORDER — DIPHENHYDRAMINE HCL 25 MG
25 TABLET ORAL EVERY 6 HOURS PRN
Status: DISCONTINUED | OUTPATIENT
Start: 2019-08-24 | End: 2019-08-25

## 2019-08-24 RX ORDER — NICOTINE 21 MG/24HR
1 PATCH, TRANSDERMAL 24 HOURS TRANSDERMAL DAILY
Status: DISCONTINUED | OUTPATIENT
Start: 2019-08-24 | End: 2019-08-26

## 2019-08-24 RX ORDER — POTASSIUM CHLORIDE 20 MEQ/1
40 TABLET, EXTENDED RELEASE ORAL ONCE
Status: COMPLETED | OUTPATIENT
Start: 2019-08-24 | End: 2019-08-24

## 2019-08-24 RX ADMIN — LORAZEPAM 4 MG: 2 INJECTION INTRAMUSCULAR; INTRAVENOUS at 21:54

## 2019-08-24 RX ADMIN — LORAZEPAM 2 MG: 2 INJECTION INTRAMUSCULAR; INTRAVENOUS at 08:02

## 2019-08-24 RX ADMIN — POTASSIUM CHLORIDE 10 MEQ: 7.46 INJECTION, SOLUTION INTRAVENOUS at 05:00

## 2019-08-24 RX ADMIN — LORAZEPAM 2 MG: 2 TABLET ORAL at 18:34

## 2019-08-24 RX ADMIN — LORAZEPAM 2 MG: 2 TABLET ORAL at 11:57

## 2019-08-24 RX ADMIN — THERA TABS 1 TABLET: TAB at 09:27

## 2019-08-24 RX ADMIN — FAMOTIDINE 20 MG: 20 TABLET ORAL at 20:33

## 2019-08-24 RX ADMIN — FOLIC ACID 1 MG: 1 TABLET ORAL at 09:27

## 2019-08-24 RX ADMIN — POTASSIUM CHLORIDE 10 MEQ: 7.46 INJECTION, SOLUTION INTRAVENOUS at 01:00

## 2019-08-24 RX ADMIN — FAMOTIDINE 20 MG: 20 TABLET ORAL at 09:27

## 2019-08-24 RX ADMIN — DIPHENHYDRAMINE HCL 25 MG: 25 TABLET ORAL at 22:15

## 2019-08-24 RX ADMIN — POTASSIUM CHLORIDE 40 MEQ: 20 TABLET, EXTENDED RELEASE ORAL at 20:33

## 2019-08-24 RX ADMIN — POTASSIUM CHLORIDE 10 MEQ: 7.46 INJECTION, SOLUTION INTRAVENOUS at 06:53

## 2019-08-24 RX ADMIN — POTASSIUM CHLORIDE 10 MEQ: 7.46 INJECTION, SOLUTION INTRAVENOUS at 03:20

## 2019-08-24 RX ADMIN — LORAZEPAM 3 MG: 2 TABLET ORAL at 20:40

## 2019-08-24 RX ADMIN — Medication 100 MG: at 09:27

## 2019-08-24 RX ADMIN — LORAZEPAM 2 MG: 2 TABLET ORAL at 14:41

## 2019-08-24 RX ADMIN — LORAZEPAM 2 MG: 2 INJECTION INTRAMUSCULAR; INTRAVENOUS at 09:31

## 2019-08-24 RX ADMIN — SODIUM CHLORIDE: 9 INJECTION, SOLUTION INTRAVENOUS at 18:55

## 2019-08-24 RX ADMIN — LORAZEPAM 3 MG: 2 INJECTION INTRAMUSCULAR; INTRAVENOUS at 03:25

## 2019-08-24 RX ADMIN — LORAZEPAM 4 MG: 2 INJECTION INTRAMUSCULAR; INTRAVENOUS at 21:04

## 2019-08-24 ASSESSMENT — PAIN SCALES - GENERAL
PAINLEVEL_OUTOF10: 0
PAINLEVEL_OUTOF10: 0

## 2019-08-24 NOTE — PLAN OF CARE
Problem: Falls - Risk of:  Goal: Will remain free from falls  Description  Will remain free from falls  Outcome: Met This Shift  Fall assessment completed. Patient using call light appropriately to call for assistance with ambulation to bathroom. Personal items within reach. Patient is also compliant with use of non-skid slippers. Problem: Risk for Impaired Skin Integrity  Goal: Tissue integrity - skin and mucous membranes  Description  Structural intactness and normal physiological function of skin and  mucous membranes. Outcome: Met This Shift  No skin breakdown this shift. Patient being assisted with turning. Patients states understanding of repositioning every two hours. Problem: Discharge Planning:  Goal: Discharged to appropriate level of care  Description  Discharged to appropriate level of care  Outcome: Ongoing  Discharge plan is in process. Plan discharge home with family. Problem: Fluid Volume - Deficit:  Goal: Absence of fluid volume deficit signs and symptoms  Description  Absence of fluid volume deficit signs and symptoms  Outcome: Ongoing  IV fluids per order. Problem: Nutrition Deficit:  Goal: Ability to achieve adequate nutritional intake will improve  Description  Ability to achieve adequate nutritional intake will improve  Outcome: Ongoing  Patients appetite good. Continuing to encourage fluids. Problem: Sleep Pattern Disturbance:  Goal: Appears well-rested  Description  Appears well-rested  Outcome: Ongoing  Patient able to sleep/rest.     Problem: Violence - Risk of, Self/Other-Directed:  Goal: Knowledge of developmental care interventions  Description  Absence of violence  Outcome: Ongoing  No violence behavior observe. Care plan reviewed with patient and family. Patient and family verbalize understanding of the plan of care and contribute to goal setting.

## 2019-08-24 NOTE — FLOWSHEET NOTE
08/24/19 1310   Encounter Summary   Services provided to: Patient;Patient and family together   Referral/Consult From: [address] [facility] Parent   Continue Visiting Yes  (8/24/19 Pt is legally blind. )   Complexity of Encounter Low   Length of Encounter 15 minutes   Routine   Type Initial   Assessment Calm; Approachable   Intervention Nurtured hope   Outcome Acceptance;Comfort   Advance Directives (For Healthcare)   Healthcare Directive No, patient does not have an advance directive for healthcare treatment   Advance Directives Documents given   During my contact with the 38 yr old patient, the pt's sitter shared that the pt was legally blind. The pt's mother was supportively present. The pt was in bed and was tired. The pt's mother spoke on the pt's behalf and stated that she would look over the documents. The documents are going to have to be explained with the pt when he is ready to sign the Advance Directive. Plan: A follow up is needed tomorrow (Sunday) when the pt's mother is present.

## 2019-08-25 LAB
ABO: NORMAL
ALBUMIN SERPL-MCNC: 3.4 G/DL (ref 3.5–5.1)
ALP BLD-CCNC: 199 U/L (ref 38–126)
ALT SERPL-CCNC: 36 U/L (ref 11–66)
ANION GAP SERPL CALCULATED.3IONS-SCNC: 13 MEQ/L (ref 8–16)
ANTIBODY SCREEN: NORMAL
AST SERPL-CCNC: 95 U/L (ref 5–40)
BILIRUB SERPL-MCNC: 0.7 MG/DL (ref 0.3–1.2)
BUN BLDV-MCNC: 9 MG/DL (ref 7–22)
CALCIUM IONIZED: 1.05 MMOL/L (ref 1.12–1.32)
CALCIUM SERPL-MCNC: 8.7 MG/DL (ref 8.5–10.5)
CHLORIDE BLD-SCNC: 99 MEQ/L (ref 98–111)
CO2: 24 MEQ/L (ref 23–33)
CREAT SERPL-MCNC: 0.6 MG/DL (ref 0.4–1.2)
ERYTHROCYTE [DISTWIDTH] IN BLOOD BY AUTOMATED COUNT: 13.2 % (ref 11.5–14.5)
ERYTHROCYTE [DISTWIDTH] IN BLOOD BY AUTOMATED COUNT: 47.6 FL (ref 35–45)
GFR SERPL CREATININE-BSD FRML MDRD: > 90 ML/MIN/1.73M2
GLUCOSE BLD-MCNC: 144 MG/DL (ref 70–108)
HCT VFR BLD CALC: 38 % (ref 42–52)
HEMOGLOBIN: 12.7 GM/DL (ref 14–18)
INR BLD: 0.99 (ref 0.85–1.13)
MCH RBC QN AUTO: 33.2 PG (ref 26–33)
MCHC RBC AUTO-ENTMCNC: 33.4 GM/DL (ref 32.2–35.5)
MCV RBC AUTO: 99.2 FL (ref 80–94)
PLATELET # BLD: 54 THOU/MM3 (ref 130–400)
PMV BLD AUTO: 11.8 FL (ref 9.4–12.4)
POTASSIUM SERPL-SCNC: 2.9 MEQ/L (ref 3.5–5.2)
RBC # BLD: 3.83 MILL/MM3 (ref 4.7–6.1)
RH FACTOR: NORMAL
SCAN OF BLOOD SMEAR: NORMAL
SODIUM BLD-SCNC: 136 MEQ/L (ref 135–145)
TOTAL PROTEIN: 5.7 G/DL (ref 6.1–8)
WBC # BLD: 4.8 THOU/MM3 (ref 4.8–10.8)

## 2019-08-25 PROCEDURE — 6360000002 HC RX W HCPCS: Performed by: PHYSICIAN ASSISTANT

## 2019-08-25 PROCEDURE — 6360000002 HC RX W HCPCS: Performed by: INTERNAL MEDICINE

## 2019-08-25 PROCEDURE — 2709999900 HC NON-CHARGEABLE SUPPLY

## 2019-08-25 PROCEDURE — 82330 ASSAY OF CALCIUM: CPT

## 2019-08-25 PROCEDURE — 86901 BLOOD TYPING SEROLOGIC RH(D): CPT

## 2019-08-25 PROCEDURE — 36415 COLL VENOUS BLD VENIPUNCTURE: CPT

## 2019-08-25 PROCEDURE — 85610 PROTHROMBIN TIME: CPT

## 2019-08-25 PROCEDURE — 80053 COMPREHEN METABOLIC PANEL: CPT

## 2019-08-25 PROCEDURE — 6370000000 HC RX 637 (ALT 250 FOR IP): Performed by: INTERNAL MEDICINE

## 2019-08-25 PROCEDURE — 86900 BLOOD TYPING SEROLOGIC ABO: CPT

## 2019-08-25 PROCEDURE — 2580000003 HC RX 258: Performed by: INTERNAL MEDICINE

## 2019-08-25 PROCEDURE — 86850 RBC ANTIBODY SCREEN: CPT

## 2019-08-25 PROCEDURE — 1200000003 HC TELEMETRY R&B

## 2019-08-25 PROCEDURE — 99233 SBSQ HOSP IP/OBS HIGH 50: CPT | Performed by: INTERNAL MEDICINE

## 2019-08-25 PROCEDURE — 85027 COMPLETE CBC AUTOMATED: CPT

## 2019-08-25 RX ORDER — DIPHENHYDRAMINE HYDROCHLORIDE 50 MG/ML
25 INJECTION INTRAMUSCULAR; INTRAVENOUS EVERY 6 HOURS PRN
Status: DISCONTINUED | OUTPATIENT
Start: 2019-08-25 | End: 2019-08-29 | Stop reason: HOSPADM

## 2019-08-25 RX ORDER — POTASSIUM CHLORIDE 20 MEQ/1
40 TABLET, EXTENDED RELEASE ORAL 2 TIMES DAILY WITH MEALS
Status: COMPLETED | OUTPATIENT
Start: 2019-08-25 | End: 2019-08-26

## 2019-08-25 RX ORDER — PHENOBARBITAL 32.4 MG/1
32.4 TABLET ORAL 2 TIMES DAILY
Status: CANCELLED | OUTPATIENT
Start: 2019-08-25

## 2019-08-25 RX ORDER — HALOPERIDOL 5 MG/ML
5 INJECTION INTRAMUSCULAR EVERY 6 HOURS PRN
Status: DISCONTINUED | OUTPATIENT
Start: 2019-08-25 | End: 2019-08-26

## 2019-08-25 RX ORDER — PHENOBARBITAL 32.4 MG/1
32.4 TABLET ORAL EVERY 12 HOURS PRN
Status: DISCONTINUED | OUTPATIENT
Start: 2019-08-25 | End: 2019-08-26

## 2019-08-25 RX ORDER — DIPHENHYDRAMINE HYDROCHLORIDE 50 MG/ML
25 INJECTION INTRAMUSCULAR; INTRAVENOUS ONCE
Status: COMPLETED | OUTPATIENT
Start: 2019-08-25 | End: 2019-08-25

## 2019-08-25 RX ORDER — LORAZEPAM 2 MG/ML
2 INJECTION INTRAMUSCULAR EVERY 4 HOURS PRN
Status: DISCONTINUED | OUTPATIENT
Start: 2019-08-25 | End: 2019-08-26

## 2019-08-25 RX ORDER — LORAZEPAM 2 MG/ML
2 INJECTION INTRAMUSCULAR ONCE
Status: DISCONTINUED | OUTPATIENT
Start: 2019-08-25 | End: 2019-08-26

## 2019-08-25 RX ORDER — ERYTHROMYCIN 5 MG/G
OINTMENT OPHTHALMIC NIGHTLY
Status: DISCONTINUED | OUTPATIENT
Start: 2019-08-25 | End: 2019-08-29 | Stop reason: HOSPADM

## 2019-08-25 RX ADMIN — LORAZEPAM 4 MG: 2 INJECTION INTRAMUSCULAR; INTRAVENOUS at 22:13

## 2019-08-25 RX ADMIN — DIPHENHYDRAMINE HYDROCHLORIDE 25 MG: 50 INJECTION, SOLUTION INTRAMUSCULAR; INTRAVENOUS at 01:34

## 2019-08-25 RX ADMIN — HALOPERIDOL LACTATE 5 MG: 5 INJECTION, SOLUTION INTRAMUSCULAR at 02:00

## 2019-08-25 RX ADMIN — LORAZEPAM 4 MG: 2 TABLET ORAL at 19:57

## 2019-08-25 RX ADMIN — ACETAMINOPHEN 650 MG: 325 TABLET ORAL at 19:57

## 2019-08-25 RX ADMIN — LORAZEPAM 2 MG: 2 TABLET ORAL at 16:34

## 2019-08-25 RX ADMIN — LORAZEPAM 4 MG: 2 INJECTION INTRAMUSCULAR; INTRAVENOUS at 01:42

## 2019-08-25 RX ADMIN — POTASSIUM CHLORIDE 40 MEQ: 20 TABLET, EXTENDED RELEASE ORAL at 16:34

## 2019-08-25 RX ADMIN — FAMOTIDINE 20 MG: 20 TABLET ORAL at 19:57

## 2019-08-25 RX ADMIN — LORAZEPAM 4 MG: 2 INJECTION INTRAMUSCULAR; INTRAVENOUS at 13:59

## 2019-08-25 RX ADMIN — LORAZEPAM 2 MG: 2 TABLET ORAL at 17:27

## 2019-08-25 RX ADMIN — FAMOTIDINE 20 MG: 20 TABLET ORAL at 08:20

## 2019-08-25 RX ADMIN — SODIUM CHLORIDE, PRESERVATIVE FREE 10 ML: 5 INJECTION INTRAVENOUS at 22:15

## 2019-08-25 RX ADMIN — FOLIC ACID 1 MG: 1 TABLET ORAL at 08:19

## 2019-08-25 RX ADMIN — THERA TABS 1 TABLET: TAB at 08:19

## 2019-08-25 RX ADMIN — LORAZEPAM 4 MG: 2 INJECTION INTRAMUSCULAR; INTRAVENOUS at 08:18

## 2019-08-25 RX ADMIN — Medication 10 ML: at 08:28

## 2019-08-25 RX ADMIN — HALOPERIDOL LACTATE 5 MG: 5 INJECTION, SOLUTION INTRAMUSCULAR at 08:42

## 2019-08-25 RX ADMIN — Medication 100 MG: at 08:19

## 2019-08-25 RX ADMIN — LORAZEPAM 4 MG: 2 TABLET ORAL at 00:45

## 2019-08-25 RX ADMIN — LORAZEPAM 4 MG: 2 INJECTION INTRAMUSCULAR; INTRAVENOUS at 13:00

## 2019-08-25 RX ADMIN — LORAZEPAM 4 MG: 2 TABLET ORAL at 18:51

## 2019-08-25 ASSESSMENT — PAIN DESCRIPTION - PROGRESSION
CLINICAL_PROGRESSION: NOT CHANGED

## 2019-08-25 ASSESSMENT — PAIN - FUNCTIONAL ASSESSMENT: PAIN_FUNCTIONAL_ASSESSMENT: PREVENTS OR INTERFERES SOME ACTIVE ACTIVITIES AND ADLS

## 2019-08-25 ASSESSMENT — PAIN SCALES - GENERAL
PAINLEVEL_OUTOF10: 5

## 2019-08-25 ASSESSMENT — PAIN DESCRIPTION - FREQUENCY: FREQUENCY: CONTINUOUS

## 2019-08-25 ASSESSMENT — PAIN DESCRIPTION - DESCRIPTORS: DESCRIPTORS: ACHING

## 2019-08-25 ASSESSMENT — PAIN DESCRIPTION - PAIN TYPE: TYPE: ACUTE PAIN

## 2019-08-25 ASSESSMENT — PAIN DESCRIPTION - ONSET: ONSET: PROGRESSIVE

## 2019-08-25 ASSESSMENT — PAIN DESCRIPTION - LOCATION: LOCATION: HEAD

## 2019-08-25 NOTE — PLAN OF CARE
Problem: Falls - Risk of:  Goal: Will remain free from falls  Description  Will remain free from falls  8/25/2019 0408 by Edyta Perez RN  Outcome: Ongoing  Note:   Patient bed in lowest position, wheels locked, 3/4 side rails up and alarm on. Call light and belongings within reach. Pathway clear. Nonskid footwear on. Patient rounded on hourly. Fall risk assessment complete.  sitter with patient. Patient on multiple drugs and has unsteady gait. Patient remains in bed. Frequent attempts to climb over bed rails. Patient remains free from falls this shift, will continue to monitor. Problem: Fluid Volume - Deficit:  Goal: Absence of fluid volume deficit signs and symptoms  Description  Absence of fluid volume deficit signs and symptoms  8/25/2019 0408 by Edyta Perez RN  Outcome: Ongoing  Note:   NS @ 40 mL/hr. Oral potassium given. Oral hydration encouraged. Skin turgor less than 3 seconds. No edema present. Problem: Nutrition Deficit:  Goal: Ability to achieve adequate nutritional intake will improve  Description  Ability to achieve adequate nutritional intake will improve  8/25/2019 0408 by Edyta Perez RN  Outcome: Ongoing  Note:   Patient displays adequate appetite. 8/24/2019 1812 by Fermin Smith RN  Outcome: Ongoing     Problem: Sleep Pattern Disturbance:  Goal: Appears well-rested  Description  Appears well-rested  8/25/2019 0408 by Edyta Perez RN  Outcome: Ongoing  Note:   Patient agitated throughout night. Ativan, Benadryl and Haldol given. Provided non stimulating environment. Problem: Coping:  Goal: Level of agitation/anxiety will decrease  Description  Level of agitation/anxiety will decrease  Outcome: Ongoing  Note:   Patient displays aggressive behavior and impulsive thinking. Ativan and Haldol given. Patient disoriented x4, unable to follow commands. Patient frequently reoriented. Room dark and door closed to promote comfort and decrease stimulus.

## 2019-08-25 NOTE — PLAN OF CARE
Problem: Falls - Risk of:  Goal: Will remain free from falls  Description  Will remain free from falls  8/25/2019 1215 by Goldy Gan RN  Outcome: Met This Shift  Fall assessment completed. Patient using call light appropriately to call for assistance with ambulation to bathroom. Personal items within reach. Patient is also compliant with use of non-skid slippers. Problem: Nutrition Deficit:  Goal: Ability to achieve adequate nutritional intake will improve  Description  Ability to achieve adequate nutritional intake will improve  8/25/2019 1215 by Goldy Gan RN  Outcome: Met This Shift  Patients appetite good. Continuing to encourage fluids. Problem: Risk for Impaired Skin Integrity  Goal: Tissue integrity - skin and mucous membranes  Description  Structural intactness and normal physiological function of skin and  mucous membranes. 8/25/2019 1215 by Goldy Gan RN  Outcome: Ongoing  No skin breakdown this shift. Patient being assisted with turning. Patients states understanding of repositioning every two hours. Problem: Discharge Planning:  Goal: Discharged to appropriate level of care  Description  Discharged to appropriate level of care  8/25/2019 1215 by Goldy Gan RN  Outcome: Ongoing  Discharge plan is in process. Plan discharge home with family. Problem: Sleep Pattern Disturbance:  Goal: Appears well-rested  Description  Appears well-rested  8/25/2019 1215 by Goldy Gan RN  Outcome: Ongoing  Patient able to sleep/rest after ativan/haldol injection. Problem: Violence - Risk of, Self/Other-Directed:  Goal: Knowledge of developmental care interventions  Description  Absence of violence  Outcome: Ongoing  Patient agitated at times. Ativan/haldol PRN - given.      Problem: Coping:  Goal: Level of agitation/anxiety will decrease  Description  Level of agitation/anxiety will decrease  8/25/2019 1215 by Goldy Gan RN  Outcome:

## 2019-08-26 LAB
ALBUMIN SERPL-MCNC: 4.1 G/DL (ref 3.5–5.1)
ALP BLD-CCNC: 211 U/L (ref 38–126)
ALT SERPL-CCNC: 44 U/L (ref 11–66)
ANION GAP SERPL CALCULATED.3IONS-SCNC: 16 MEQ/L (ref 8–16)
AST SERPL-CCNC: 92 U/L (ref 5–40)
BILIRUB SERPL-MCNC: 1 MG/DL (ref 0.3–1.2)
BUN BLDV-MCNC: 5 MG/DL (ref 7–22)
CALCIUM SERPL-MCNC: 9.2 MG/DL (ref 8.5–10.5)
CHLORIDE BLD-SCNC: 101 MEQ/L (ref 98–111)
CO2: 22 MEQ/L (ref 23–33)
CREAT SERPL-MCNC: 0.5 MG/DL (ref 0.4–1.2)
ERYTHROCYTE [DISTWIDTH] IN BLOOD BY AUTOMATED COUNT: 13.2 % (ref 11.5–14.5)
ERYTHROCYTE [DISTWIDTH] IN BLOOD BY AUTOMATED COUNT: 51 FL (ref 35–45)
GFR SERPL CREATININE-BSD FRML MDRD: > 90 ML/MIN/1.73M2
GLUCOSE BLD-MCNC: 113 MG/DL (ref 70–108)
HCT VFR BLD CALC: 43.4 % (ref 42–52)
HEMOGLOBIN: 13.9 GM/DL (ref 14–18)
INR BLD: 0.94 (ref 0.85–1.13)
MCH RBC QN AUTO: 33.4 PG (ref 26–33)
MCHC RBC AUTO-ENTMCNC: 32 GM/DL (ref 32.2–35.5)
MCV RBC AUTO: 104.3 FL (ref 80–94)
MRSA SCREEN RT-PCR: NEGATIVE
PLATELET # BLD: 87 THOU/MM3 (ref 130–400)
PMV BLD AUTO: 11.2 FL (ref 9.4–12.4)
POTASSIUM SERPL-SCNC: 3.1 MEQ/L (ref 3.5–5.2)
RBC # BLD: 4.16 MILL/MM3 (ref 4.7–6.1)
SODIUM BLD-SCNC: 139 MEQ/L (ref 135–145)
TOTAL PROTEIN: 6.7 G/DL (ref 6.1–8)
VANCOMYCIN RESISTANT ENTEROCOCCUS: NEGATIVE
WBC # BLD: 5.5 THOU/MM3 (ref 4.8–10.8)

## 2019-08-26 PROCEDURE — 6370000000 HC RX 637 (ALT 250 FOR IP): Performed by: INTERNAL MEDICINE

## 2019-08-26 PROCEDURE — 80053 COMPREHEN METABOLIC PANEL: CPT

## 2019-08-26 PROCEDURE — 36415 COLL VENOUS BLD VENIPUNCTURE: CPT

## 2019-08-26 PROCEDURE — 87081 CULTURE SCREEN ONLY: CPT

## 2019-08-26 PROCEDURE — 6370000000 HC RX 637 (ALT 250 FOR IP): Performed by: PHYSICIAN ASSISTANT

## 2019-08-26 PROCEDURE — 2580000003 HC RX 258: Performed by: NURSE PRACTITIONER

## 2019-08-26 PROCEDURE — 99233 SBSQ HOSP IP/OBS HIGH 50: CPT | Performed by: INTERNAL MEDICINE

## 2019-08-26 PROCEDURE — 2709999900 HC NON-CHARGEABLE SUPPLY

## 2019-08-26 PROCEDURE — 6360000002 HC RX W HCPCS: Performed by: PHYSICIAN ASSISTANT

## 2019-08-26 PROCEDURE — 6360000002 HC RX W HCPCS: Performed by: INTERNAL MEDICINE

## 2019-08-26 PROCEDURE — 87500 VANOMYCIN DNA AMP PROBE: CPT

## 2019-08-26 PROCEDURE — 85610 PROTHROMBIN TIME: CPT

## 2019-08-26 PROCEDURE — 87641 MR-STAPH DNA AMP PROBE: CPT

## 2019-08-26 PROCEDURE — 2580000003 HC RX 258: Performed by: INTERNAL MEDICINE

## 2019-08-26 PROCEDURE — 2000000000 HC ICU R&B

## 2019-08-26 PROCEDURE — 85027 COMPLETE CBC AUTOMATED: CPT

## 2019-08-26 PROCEDURE — 2500000003 HC RX 250 WO HCPCS: Performed by: NURSE PRACTITIONER

## 2019-08-26 RX ADMIN — LORAZEPAM 4 MG: 2 INJECTION INTRAMUSCULAR; INTRAVENOUS at 05:02

## 2019-08-26 RX ADMIN — SODIUM CHLORIDE: 9 INJECTION, SOLUTION INTRAVENOUS at 22:47

## 2019-08-26 RX ADMIN — FOLIC ACID 1 MG: 1 TABLET ORAL at 08:43

## 2019-08-26 RX ADMIN — Medication 100 MG: at 08:43

## 2019-08-26 RX ADMIN — POTASSIUM CHLORIDE 40 MEQ: 20 TABLET, EXTENDED RELEASE ORAL at 08:42

## 2019-08-26 RX ADMIN — PHENOBARBITAL 32.4 MG: 32.4 TABLET ORAL at 03:57

## 2019-08-26 RX ADMIN — DEXMEDETOMIDINE HYDROCHLORIDE 62 MCG: 100 INJECTION, SOLUTION INTRAVENOUS at 20:24

## 2019-08-26 RX ADMIN — FAMOTIDINE 20 MG: 20 TABLET ORAL at 08:43

## 2019-08-26 RX ADMIN — DIPHENHYDRAMINE HYDROCHLORIDE 25 MG: 50 INJECTION, SOLUTION INTRAMUSCULAR; INTRAVENOUS at 05:02

## 2019-08-26 RX ADMIN — LORAZEPAM 4 MG: 2 TABLET ORAL at 01:29

## 2019-08-26 RX ADMIN — FAMOTIDINE 20 MG: 20 TABLET ORAL at 20:31

## 2019-08-26 RX ADMIN — PHENOBARBITAL SODIUM 260 MG: 65 INJECTION INTRAMUSCULAR; INTRAVENOUS at 12:01

## 2019-08-26 RX ADMIN — LORAZEPAM 4 MG: 2 INJECTION INTRAMUSCULAR; INTRAVENOUS at 03:56

## 2019-08-26 RX ADMIN — THERA TABS 1 TABLET: TAB at 08:42

## 2019-08-26 RX ADMIN — DEXMEDETOMIDINE 0.2 MCG/KG/HR: 100 INJECTION, SOLUTION, CONCENTRATE INTRAVENOUS at 20:34

## 2019-08-26 RX ADMIN — PHENOBARBITAL SODIUM 260 MG: 65 INJECTION INTRAMUSCULAR; INTRAVENOUS at 12:57

## 2019-08-26 RX ADMIN — DIPHENHYDRAMINE HYDROCHLORIDE 25 MG: 50 INJECTION, SOLUTION INTRAMUSCULAR; INTRAVENOUS at 11:03

## 2019-08-26 RX ADMIN — LORAZEPAM 4 MG: 2 TABLET ORAL at 00:08

## 2019-08-26 RX ADMIN — PHENOBARBITAL SODIUM 260 MG: 65 INJECTION INTRAMUSCULAR; INTRAVENOUS at 10:29

## 2019-08-26 RX ADMIN — SODIUM CHLORIDE: 9 INJECTION, SOLUTION INTRAVENOUS at 09:12

## 2019-08-26 RX ADMIN — HALOPERIDOL LACTATE 5 MG: 5 INJECTION, SOLUTION INTRAMUSCULAR at 01:28

## 2019-08-26 ASSESSMENT — PAIN DESCRIPTION - PROGRESSION
CLINICAL_PROGRESSION: NOT CHANGED

## 2019-08-26 ASSESSMENT — PAIN SCALES - GENERAL
PAINLEVEL_OUTOF10: 0
PAINLEVEL_OUTOF10: 4

## 2019-08-26 NOTE — CARE COORDINATION
8/26/19, 10:38 AM      Adán Meyers       Admitted from: [facility] from [facility] [place] [facility] 8/23/2019/ [facility] day: 3   Location: 4D-15/015-A Reason for admit: Hypokalemia [E87.6] Status: IP  Admit order signed?: yes  PMH:  has a past medical history of Acute pancreatitis, Alcohol abuse, Hypertension, Pneumonia, Retinal hemorrhage, Rhabdomyolysis, Seizures ([address] and Traumatic brain injury ([facility] Procedure:   8/24 US Liver/Spleen: Diffuse gallbladder wall thickening. This is nonspecific but can be seen in the setting of hypoalbuminemia. Other considerations include fluid overload states and primary or secondary infectious/inflammatory processes; hepatic steatosis; hypoplastic spleen  Medications:  Scheduled Meds:   phosphorus replacement protocol   Other RX Placeholder    calcium replacement protocol   Other RX Placeholder    erythromycin   Both Eyes Nightly    sodium chloride flush  10 mL Intravenous 2 times per day    sodium chloride flush  10 mL Intravenous 2 times per day    thiamine  100 mg Oral Daily    folic acid  1 mg Oral Daily    multivitamin  1 tablet Oral Daily    famotidine  20 mg Oral BID     Continuous Infusions:   sodium chloride 75 mL/hr at 08/26/19 0912      Pertinent Info/Orders/Treatment Plan: Presented to [facility] with c/o diarrhea and was covered in stool. Hx of alcohol abuse and requesting detox. Patient is legally blind. Transferred to [facility]. Psychiatry consulted. Addiction Services consulted. Tmax 99. SR 90's-'s. On room air. Oriented to person only. Follows commands, delayed responses. I&O, seizure precautions, n/v checks, SCDs, up with assist. IVF, prn benadryl, pepcid, folic acid, multivitamin, prn phenobarbital, thiamine, Electrolyte replacement protocols. Na+ 132 - now 139, K+ 2.9 -now 3.1, CO2 22, Ical 0.93 - now 1.05, mg 1.3 - now 2, phos 2.2, alk phos 240 - now 211, ast 128 - now 92, lipase 130.9, hgb 13.9, plt 28 - now 87.  Blood cultures sent. Diet: DIET GENERAL;   Smoking status:  reports that he has been smoking cigarettes. He has been smoking about 0.50 packs per day. He has never used smokeless tobacco.   PCP: JACQUE Cuellar CNP  Readmission: no  Readmission Risk Score: 10%    Discharge Planning  Current Residence:     Living Arrangements:      Support Systems:     Current Services PTA:     Potential Assistance Needed:     Potential Assistance Purchasing Medications:     Does patient want to participate in local refill/ meds to beds program?     Type of Home Care Services:     Patient expects to be discharged to:     Expected Discharge date:  08/27/19  Follow Up Appointment: Best Day/ Time:      Discharge Plan: From home alone and did not use any DME PTA, even refuses to use white cane. SW on case. Plan to return home at discharge, denies needs, declines HH.  Plan for IOP at [facility] per Dr. Timothy Ames.

## 2019-08-26 NOTE — CARE COORDINATION
DISCHARGE BARRIERS  8/26/19, 11:26 AM    Reason for Referral: Discharge planning, alcohol rehab. Mental Status: Patient alert and oriented pta. Decision Making: Patient makes his own decisions. Family/Social/Home Environment: Assessment completed with patient and his mother, Aminah Brush. Patient was living out of state and moved to [facility] to live with his mother for the past year. Patient decided 4 months ago to get a place of his own and moved to [place]. 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 Patient states he has a friend David Bustillo who takes him grocery shopping, his apartment was just professionally cleaned and he cooks mainly microwave meals. Patient states he is able to walk to town if he needs anything and refuses to use a white cane. Patient admits he has fallen twice on sidewalk. Mother states she is moving to [place] on August 5th if patient is doing ok. Mother states she has offered for patient to move with her once she finds him an apartment and patient has declined. SW spoke to patient about IOP as recommended by Dr MONTOYA, patient states he is undecided and feels he can stay sober without using resources. Mother disagreed. Current Services: none reported. Current Equipment: no dme reported. Payment Source: [employer]. Concerns or Barriers to Discharge: unknown at this time. Collabrative List of ECF/HH were provided: no list provided at this time. Teach Back Method used with patient regarding care plan and discharge planning. Patient verbalized understanding of the plan of care and contribute to goal setting. Anticipated Needs/Discharge Plan: Discharge plan undetermined. From home alone and refuses services at this time.      Electronically signed by UGO Sawyer on 8/26/2019 at 11:26 AM

## 2019-08-27 LAB
ALBUMIN SERPL-MCNC: 3.5 G/DL (ref 3.5–5.1)
ALP BLD-CCNC: 173 U/L (ref 38–126)
ALT SERPL-CCNC: 57 U/L (ref 11–66)
ANION GAP SERPL CALCULATED.3IONS-SCNC: 11 MEQ/L (ref 8–16)
AST SERPL-CCNC: 108 U/L (ref 5–40)
BILIRUB SERPL-MCNC: 0.6 MG/DL (ref 0.3–1.2)
BUN BLDV-MCNC: 5 MG/DL (ref 7–22)
CALCIUM SERPL-MCNC: 8.8 MG/DL (ref 8.5–10.5)
CHLORIDE BLD-SCNC: 103 MEQ/L (ref 98–111)
CO2: 25 MEQ/L (ref 23–33)
CREAT SERPL-MCNC: 0.5 MG/DL (ref 0.4–1.2)
EKG ATRIAL RATE: 52 BPM
EKG P AXIS: 70 DEGREES
EKG P-R INTERVAL: 200 MS
EKG Q-T INTERVAL: 466 MS
EKG QRS DURATION: 98 MS
EKG QTC CALCULATION (BAZETT): 433 MS
EKG R AXIS: 33 DEGREES
EKG T AXIS: 75 DEGREES
EKG VENTRICULAR RATE: 52 BPM
ERYTHROCYTE [DISTWIDTH] IN BLOOD BY AUTOMATED COUNT: 13.5 % (ref 11.5–14.5)
ERYTHROCYTE [DISTWIDTH] IN BLOOD BY AUTOMATED COUNT: 48.5 FL (ref 35–45)
GFR SERPL CREATININE-BSD FRML MDRD: > 90 ML/MIN/1.73M2
GLUCOSE BLD-MCNC: 83 MG/DL (ref 70–108)
HCT VFR BLD CALC: 39.9 % (ref 42–52)
HEMOGLOBIN: 13.4 GM/DL (ref 14–18)
INR BLD: 0.95 (ref 0.85–1.13)
MCH RBC QN AUTO: 32.8 PG (ref 26–33)
MCHC RBC AUTO-ENTMCNC: 33.6 GM/DL (ref 32.2–35.5)
MCV RBC AUTO: 97.8 FL (ref 80–94)
PLATELET # BLD: 113 THOU/MM3 (ref 130–400)
PMV BLD AUTO: 10.1 FL (ref 9.4–12.4)
POTASSIUM SERPL-SCNC: 3.3 MEQ/L (ref 3.5–5.2)
POTASSIUM SERPL-SCNC: 3.4 MEQ/L (ref 3.5–5.2)
RBC # BLD: 4.08 MILL/MM3 (ref 4.7–6.1)
SODIUM BLD-SCNC: 139 MEQ/L (ref 135–145)
TOTAL PROTEIN: 5.8 G/DL (ref 6.1–8)
TROPONIN T: < 0.01 NG/ML
WBC # BLD: 6 THOU/MM3 (ref 4.8–10.8)

## 2019-08-27 PROCEDURE — 1200000003 HC TELEMETRY R&B

## 2019-08-27 PROCEDURE — 85610 PROTHROMBIN TIME: CPT

## 2019-08-27 PROCEDURE — 2709999900 HC NON-CHARGEABLE SUPPLY

## 2019-08-27 PROCEDURE — 6360000002 HC RX W HCPCS: Performed by: INTERNAL MEDICINE

## 2019-08-27 PROCEDURE — 93005 ELECTROCARDIOGRAM TRACING: CPT | Performed by: NURSE PRACTITIONER

## 2019-08-27 PROCEDURE — 2580000003 HC RX 258: Performed by: INTERNAL MEDICINE

## 2019-08-27 PROCEDURE — 6370000000 HC RX 637 (ALT 250 FOR IP): Performed by: INTERNAL MEDICINE

## 2019-08-27 PROCEDURE — 36415 COLL VENOUS BLD VENIPUNCTURE: CPT

## 2019-08-27 PROCEDURE — 84484 ASSAY OF TROPONIN QUANT: CPT

## 2019-08-27 PROCEDURE — 1200000000 HC SEMI PRIVATE

## 2019-08-27 PROCEDURE — 99233 SBSQ HOSP IP/OBS HIGH 50: CPT | Performed by: INTERNAL MEDICINE

## 2019-08-27 PROCEDURE — 93010 ELECTROCARDIOGRAM REPORT: CPT | Performed by: NUCLEAR MEDICINE

## 2019-08-27 PROCEDURE — 6370000000 HC RX 637 (ALT 250 FOR IP): Performed by: STUDENT IN AN ORGANIZED HEALTH CARE EDUCATION/TRAINING PROGRAM

## 2019-08-27 PROCEDURE — 80053 COMPREHEN METABOLIC PANEL: CPT

## 2019-08-27 PROCEDURE — 85027 COMPLETE CBC AUTOMATED: CPT

## 2019-08-27 PROCEDURE — 94761 N-INVAS EAR/PLS OXIMETRY MLT: CPT

## 2019-08-27 PROCEDURE — 84132 ASSAY OF SERUM POTASSIUM: CPT

## 2019-08-27 RX ORDER — PHENOBARBITAL 32.4 MG/1
64.8 TABLET ORAL 2 TIMES DAILY
Status: COMPLETED | OUTPATIENT
Start: 2019-08-27 | End: 2019-08-27

## 2019-08-27 RX ORDER — PHENOBARBITAL 32.4 MG/1
32.4 TABLET ORAL 2 TIMES DAILY
Status: COMPLETED | OUTPATIENT
Start: 2019-08-28 | End: 2019-08-29

## 2019-08-27 RX ORDER — DEXMEDETOMIDINE HYDROCHLORIDE 4 UG/ML
0.2 INJECTION, SOLUTION INTRAVENOUS CONTINUOUS
Status: DISCONTINUED | OUTPATIENT
Start: 2019-08-27 | End: 2019-08-27 | Stop reason: CLARIF

## 2019-08-27 RX ORDER — POTASSIUM CHLORIDE 7.45 MG/ML
10 INJECTION INTRAVENOUS
Status: COMPLETED | OUTPATIENT
Start: 2019-08-27 | End: 2019-08-27

## 2019-08-27 RX ORDER — QUETIAPINE FUMARATE 25 MG/1
50 TABLET, FILM COATED ORAL NIGHTLY
Status: DISCONTINUED | OUTPATIENT
Start: 2019-08-27 | End: 2019-08-29 | Stop reason: HOSPADM

## 2019-08-27 RX ADMIN — FOLIC ACID 1 MG: 1 TABLET ORAL at 09:07

## 2019-08-27 RX ADMIN — PHENOBARBITAL 64.8 MG: 32.4 TABLET ORAL at 20:54

## 2019-08-27 RX ADMIN — QUETIAPINE FUMARATE 50 MG: 25 TABLET ORAL at 21:41

## 2019-08-27 RX ADMIN — THERA TABS 1 TABLET: TAB at 09:07

## 2019-08-27 RX ADMIN — POTASSIUM CHLORIDE 10 MEQ: 7.46 INJECTION, SOLUTION INTRAVENOUS at 09:08

## 2019-08-27 RX ADMIN — FAMOTIDINE 20 MG: 20 TABLET ORAL at 20:54

## 2019-08-27 RX ADMIN — FAMOTIDINE 20 MG: 20 TABLET ORAL at 09:07

## 2019-08-27 RX ADMIN — POTASSIUM CHLORIDE 40 MEQ: 1500 TABLET, EXTENDED RELEASE ORAL at 20:54

## 2019-08-27 RX ADMIN — POTASSIUM CHLORIDE 10 MEQ: 7.46 INJECTION, SOLUTION INTRAVENOUS at 06:55

## 2019-08-27 RX ADMIN — PHENOBARBITAL 64.8 MG: 32.4 TABLET ORAL at 11:00

## 2019-08-27 RX ADMIN — SODIUM CHLORIDE: 9 INJECTION, SOLUTION INTRAVENOUS at 11:08

## 2019-08-27 RX ADMIN — POTASSIUM CHLORIDE 10 MEQ: 7.46 INJECTION, SOLUTION INTRAVENOUS at 05:02

## 2019-08-27 RX ADMIN — POTASSIUM CHLORIDE 10 MEQ: 7.46 INJECTION, SOLUTION INTRAVENOUS at 11:06

## 2019-08-27 RX ADMIN — Medication 100 MG: at 09:07

## 2019-08-27 ASSESSMENT — PAIN SCALES - GENERAL
PAINLEVEL_OUTOF10: 0

## 2019-08-28 LAB
ALBUMIN SERPL-MCNC: 3.3 G/DL (ref 3.5–5.1)
ALP BLD-CCNC: 143 U/L (ref 38–126)
ALT SERPL-CCNC: 52 U/L (ref 11–66)
ANION GAP SERPL CALCULATED.3IONS-SCNC: 10 MEQ/L (ref 8–16)
AST SERPL-CCNC: 72 U/L (ref 5–40)
BILIRUB SERPL-MCNC: 0.6 MG/DL (ref 0.3–1.2)
BUN BLDV-MCNC: 6 MG/DL (ref 7–22)
CALCIUM SERPL-MCNC: 8.7 MG/DL (ref 8.5–10.5)
CHLORIDE BLD-SCNC: 101 MEQ/L (ref 98–111)
CO2: 24 MEQ/L (ref 23–33)
CREAT SERPL-MCNC: 0.4 MG/DL (ref 0.4–1.2)
ERYTHROCYTE [DISTWIDTH] IN BLOOD BY AUTOMATED COUNT: 14.1 % (ref 11.5–14.5)
ERYTHROCYTE [DISTWIDTH] IN BLOOD BY AUTOMATED COUNT: 50.1 FL (ref 35–45)
GFR SERPL CREATININE-BSD FRML MDRD: > 90 ML/MIN/1.73M2
GLUCOSE BLD-MCNC: 108 MG/DL (ref 70–108)
HCT VFR BLD CALC: 38.5 % (ref 42–52)
HEMOGLOBIN: 12.8 GM/DL (ref 14–18)
INR BLD: 0.92 (ref 0.85–1.13)
MCH RBC QN AUTO: 33 PG (ref 26–33)
MCHC RBC AUTO-ENTMCNC: 33.2 GM/DL (ref 32.2–35.5)
MCV RBC AUTO: 99.2 FL (ref 80–94)
MRSA SCREEN: NORMAL
PLATELET # BLD: 168 THOU/MM3 (ref 130–400)
PMV BLD AUTO: 9.4 FL (ref 9.4–12.4)
POTASSIUM SERPL-SCNC: 4.1 MEQ/L (ref 3.5–5.2)
RBC # BLD: 3.88 MILL/MM3 (ref 4.7–6.1)
SODIUM BLD-SCNC: 135 MEQ/L (ref 135–145)
TOTAL PROTEIN: 5.7 G/DL (ref 6.1–8)
WBC # BLD: 5.9 THOU/MM3 (ref 4.8–10.8)

## 2019-08-28 PROCEDURE — 6370000000 HC RX 637 (ALT 250 FOR IP): Performed by: PHYSICIAN ASSISTANT

## 2019-08-28 PROCEDURE — 1200000003 HC TELEMETRY R&B

## 2019-08-28 PROCEDURE — 36415 COLL VENOUS BLD VENIPUNCTURE: CPT

## 2019-08-28 PROCEDURE — 85610 PROTHROMBIN TIME: CPT

## 2019-08-28 PROCEDURE — 6370000000 HC RX 637 (ALT 250 FOR IP): Performed by: STUDENT IN AN ORGANIZED HEALTH CARE EDUCATION/TRAINING PROGRAM

## 2019-08-28 PROCEDURE — 6370000000 HC RX 637 (ALT 250 FOR IP): Performed by: INTERNAL MEDICINE

## 2019-08-28 PROCEDURE — 99233 SBSQ HOSP IP/OBS HIGH 50: CPT | Performed by: PHYSICIAN ASSISTANT

## 2019-08-28 PROCEDURE — 80053 COMPREHEN METABOLIC PANEL: CPT

## 2019-08-28 PROCEDURE — 85027 COMPLETE CBC AUTOMATED: CPT

## 2019-08-28 RX ORDER — NICOTINE 21 MG/24HR
1 PATCH, TRANSDERMAL 24 HOURS TRANSDERMAL NIGHTLY
Status: DISCONTINUED | OUTPATIENT
Start: 2019-08-28 | End: 2019-08-29 | Stop reason: HOSPADM

## 2019-08-28 RX ADMIN — FAMOTIDINE 20 MG: 20 TABLET ORAL at 20:43

## 2019-08-28 RX ADMIN — THERA TABS 1 TABLET: TAB at 09:22

## 2019-08-28 RX ADMIN — QUETIAPINE FUMARATE 50 MG: 25 TABLET ORAL at 20:43

## 2019-08-28 RX ADMIN — FOLIC ACID 1 MG: 1 TABLET ORAL at 09:22

## 2019-08-28 RX ADMIN — Medication 100 MG: at 09:22

## 2019-08-28 RX ADMIN — PHENOBARBITAL 32.4 MG: 32.4 TABLET ORAL at 20:43

## 2019-08-28 RX ADMIN — PHENOBARBITAL 32.4 MG: 32.4 TABLET ORAL at 09:22

## 2019-08-28 RX ADMIN — FAMOTIDINE 20 MG: 20 TABLET ORAL at 09:22

## 2019-08-28 ASSESSMENT — PAIN SCALES - GENERAL
PAINLEVEL_OUTOF10: 0
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
## 2019-08-28 NOTE — PLAN OF CARE
Problem: Falls - Risk of:  Goal: Will remain free from falls  Description  Will remain free from falls  Outcome: Ongoing  Note:   No falls noted this shift. Continue falling star program. Bed alarm on, bed in low position. Call light and personal belongings in reach. Patient uses call light appropriately. Goal: Absence of physical injury  Description  Absence of physical injury  Outcome: Ongoing  Note:   No physical injury noted this shift. Safe environment provided. Will monitor. Problem: Risk for Impaired Skin Integrity  Goal: Tissue integrity - skin and mucous membranes  Description  Structural intactness and normal physiological function of skin and  mucous membranes. Outcome: Ongoing  Note:   No skin break down noted at this time. Encouraged patient to reposition self in bed. Problem: Discharge Planning:  Goal: Discharged to appropriate level of care  Description  Discharged to appropriate level of care  Outcome: Ongoing  Note:   Plans to return home alone at discharge. Will continue to assess discharge needs. Problem: Fluid Volume - Deficit:  Goal: Absence of fluid volume deficit signs and symptoms  Description  Absence of fluid volume deficit signs and symptoms  Outcome: Ongoing  Note:   0.9 @ 75ml/hr. Patient urinating adequately per urinal. Tolerating oral fluids. Will monitor. Problem: Nutrition Deficit:  Goal: Ability to achieve adequate nutritional intake will improve  Description  Ability to achieve adequate nutritional intake will improve  Outcome: Ongoing  Note:   Tolerating general diet this shift. Will monitor. Problem: Sleep Pattern Disturbance:  Goal: Appears well-rested  Description  Appears well-rested  Outcome: Ongoing  Note:   Patient resting well this shift. Will monitor.      Problem: Violence - Risk of, Self/Other-Directed:  Goal: Knowledge of developmental care interventions  Description  Absence of violence  Outcome: Ongoing     Problem: Coping:  Goal: Level of agitation/anxiety will decrease  Description  Level of agitation/anxiety will decrease  Outcome: Ongoing  Note:   No agitation noted this shift. Patient calm and cooperative. Tapering phenobarbital. Will monitor. Goal: Ability to identify and develop effective coping behavior will improve  Description  Ability to identify and develop effective coping behavior will improve  Outcome: Ongoing     Problem: Pain:  Goal: Pain level will decrease  Description  Pain level will decrease  Outcome: Ongoing  Note:   No complaint of pain voiced at this time. Continue to monitor. PRN medications available if needed. Goal: Control of acute pain  Description  Control of acute pain  Outcome: Ongoing  Note:   No complaint of pain voiced at this time. Continue to monitor. PRN medications available if needed. Goal: Control of chronic pain  Description  Control of chronic pain  Outcome: Ongoing  Note:   No complaint of pain voiced at this time. Continue to monitor. PRN medications available if needed. Care plan reviewed with patient. Patient verbalizes understanding of plan of care and contributes to goal setting.

## 2019-08-29 ENCOUNTER — TELEPHONE (OUTPATIENT)
Dept: FAMILY MEDICINE CLINIC | Age: 44
End: 2019-08-29

## 2019-08-29 VITALS
OXYGEN SATURATION: 100 % | HEART RATE: 71 BPM | TEMPERATURE: 98.3 F | SYSTOLIC BLOOD PRESSURE: 128 MMHG | BODY MASS INDEX: 17.4 KG/M2 | HEIGHT: 71 IN | WEIGHT: 124.3 LBS | DIASTOLIC BLOOD PRESSURE: 83 MMHG | RESPIRATION RATE: 16 BRPM

## 2019-08-29 LAB
ALBUMIN SERPL-MCNC: 3.2 G/DL (ref 3.5–5.1)
ALP BLD-CCNC: 121 U/L (ref 38–126)
ALT SERPL-CCNC: 49 U/L (ref 11–66)
ANION GAP SERPL CALCULATED.3IONS-SCNC: 11 MEQ/L (ref 8–16)
AST SERPL-CCNC: 63 U/L (ref 5–40)
BILIRUB SERPL-MCNC: 0.4 MG/DL (ref 0.3–1.2)
BLOOD CULTURE, ROUTINE: NORMAL
BLOOD CULTURE, ROUTINE: NORMAL
BUN BLDV-MCNC: 5 MG/DL (ref 7–22)
CALCIUM SERPL-MCNC: 8.6 MG/DL (ref 8.5–10.5)
CHLORIDE BLD-SCNC: 105 MEQ/L (ref 98–111)
CO2: 24 MEQ/L (ref 23–33)
CREAT SERPL-MCNC: 0.4 MG/DL (ref 0.4–1.2)
ERYTHROCYTE [DISTWIDTH] IN BLOOD BY AUTOMATED COUNT: 14.5 % (ref 11.5–14.5)
ERYTHROCYTE [DISTWIDTH] IN BLOOD BY AUTOMATED COUNT: 51 FL (ref 35–45)
GFR SERPL CREATININE-BSD FRML MDRD: > 90 ML/MIN/1.73M2
GLUCOSE BLD-MCNC: 101 MG/DL (ref 70–108)
HCT VFR BLD CALC: 37.2 % (ref 42–52)
HEMOGLOBIN: 12.6 GM/DL (ref 14–18)
INR BLD: 0.91 (ref 0.85–1.13)
MCH RBC QN AUTO: 33.2 PG (ref 26–33)
MCHC RBC AUTO-ENTMCNC: 33.9 GM/DL (ref 32.2–35.5)
MCV RBC AUTO: 98.2 FL (ref 80–94)
PLATELET # BLD: 205 THOU/MM3 (ref 130–400)
PMV BLD AUTO: 9.5 FL (ref 9.4–12.4)
POTASSIUM SERPL-SCNC: 3.3 MEQ/L (ref 3.5–5.2)
RBC # BLD: 3.79 MILL/MM3 (ref 4.7–6.1)
SODIUM BLD-SCNC: 140 MEQ/L (ref 135–145)
TOTAL PROTEIN: 5.5 G/DL (ref 6.1–8)
WBC # BLD: 6.1 THOU/MM3 (ref 4.8–10.8)

## 2019-08-29 PROCEDURE — 85610 PROTHROMBIN TIME: CPT

## 2019-08-29 PROCEDURE — 99239 HOSP IP/OBS DSCHRG MGMT >30: CPT | Performed by: PHYSICIAN ASSISTANT

## 2019-08-29 PROCEDURE — 80053 COMPREHEN METABOLIC PANEL: CPT

## 2019-08-29 PROCEDURE — 36415 COLL VENOUS BLD VENIPUNCTURE: CPT

## 2019-08-29 PROCEDURE — 6370000000 HC RX 637 (ALT 250 FOR IP): Performed by: PHYSICIAN ASSISTANT

## 2019-08-29 PROCEDURE — 6370000000 HC RX 637 (ALT 250 FOR IP): Performed by: STUDENT IN AN ORGANIZED HEALTH CARE EDUCATION/TRAINING PROGRAM

## 2019-08-29 PROCEDURE — 85027 COMPLETE CBC AUTOMATED: CPT

## 2019-08-29 PROCEDURE — 6370000000 HC RX 637 (ALT 250 FOR IP): Performed by: INTERNAL MEDICINE

## 2019-08-29 RX ORDER — ERYTHROMYCIN 5 MG/G
OINTMENT OPHTHALMIC EVERY 6 HOURS PRN
Qty: 1 TUBE | Refills: 0 | Status: SHIPPED | OUTPATIENT
Start: 2019-08-29 | End: 2019-09-05

## 2019-08-29 RX ORDER — FAMOTIDINE 20 MG/1
20 TABLET, FILM COATED ORAL 2 TIMES DAILY
Qty: 60 TABLET | Refills: 3 | Status: SHIPPED | OUTPATIENT
Start: 2019-08-29 | End: 2019-09-29

## 2019-08-29 RX ORDER — POTASSIUM CHLORIDE 750 MG/1
40 TABLET, FILM COATED, EXTENDED RELEASE ORAL ONCE
Status: COMPLETED | OUTPATIENT
Start: 2019-08-29 | End: 2019-08-29

## 2019-08-29 RX ORDER — ERYTHROMYCIN 5 MG/G
OINTMENT OPHTHALMIC
Qty: 1 TUBE | Refills: 3 | Status: SHIPPED | OUTPATIENT
Start: 2019-08-29 | End: 2019-08-29

## 2019-08-29 RX ORDER — LANOLIN ALCOHOL/MO/W.PET/CERES
100 CREAM (GRAM) TOPICAL DAILY
Qty: 30 TABLET | Refills: 3 | Status: SHIPPED | OUTPATIENT
Start: 2019-08-29 | End: 2019-09-29

## 2019-08-29 RX ORDER — DOCUSATE SODIUM 100 MG/1
1 CAPSULE, LIQUID FILLED ORAL 2 TIMES DAILY
COMMUNITY
Start: 2018-10-30 | End: 2019-09-03

## 2019-08-29 RX ORDER — CEPHALEXIN 500 MG/1
500 CAPSULE ORAL 3 TIMES DAILY
COMMUNITY
Start: 2018-10-20 | End: 2019-09-03

## 2019-08-29 RX ADMIN — THERA TABS 1 TABLET: TAB at 08:26

## 2019-08-29 RX ADMIN — PHENOBARBITAL 32.4 MG: 32.4 TABLET ORAL at 08:29

## 2019-08-29 RX ADMIN — Medication 100 MG: at 08:26

## 2019-08-29 RX ADMIN — FOLIC ACID 1 MG: 1 TABLET ORAL at 08:26

## 2019-08-29 RX ADMIN — POTASSIUM CHLORIDE 40 MEQ: 750 TABLET, EXTENDED RELEASE ORAL at 08:29

## 2019-08-29 RX ADMIN — FAMOTIDINE 20 MG: 20 TABLET ORAL at 08:29

## 2019-08-29 ASSESSMENT — PAIN SCALES - GENERAL
PAINLEVEL_OUTOF10: 0
PAINLEVEL_OUTOF10: 0

## 2019-08-30 NOTE — DISCHARGE SUMMARY
Hospital Medicine Discharge Summary      Patient Identification:   Jordan Khan   : 1975  MRN: [id number]   Account: [de-identified]      Patient's PCP: JACQUE Severino CNP    Admit Date: 2019     Discharge Date: 2019      Admitting Physician: Jacob Leslie DO     Discharge Physician: DESMOND Reyes     Discharge Diagnoses:   1. ETOH WD   2. Acute metabolic encephalopathy   3. Thrombocytopenia    4. Hypokalemia    5. Hypophostemia    6. Conjunctivitis    7. Agitation    8. History of HTN   9. Legally Blind           The patient was seen and examined on day of discharge and this discharge summary is in conjunction with any daily progress note from day of discharge. Hospital Course:   Jordan Khan is a 38 y.o. male admitted to [address] [place] [address] on 2019 for ETOH WD. Patient is a 38year old alcoholic from [address] [place] [address] who presented  via EMS with complaints of diarrhea and was covered in stool. Also being very tearful with c/o anxiousness. ETOH level 203. , ALT 53, normal bili. CT Head ok. K+ 2.5. Will receive Folic acid, thiamine and KCL. Denies CP or SOB. History of seizures from alcohol withdrawal, states they were years ago. History of  thrombocytopenia with platelets of 31 on admission (platelets were 38 in 2019). Patient had some tremors which Dr Xuan Meyer was unsure if it was due to the alcohol or that he was anxious and crying so patient was given Ativan and the tremors were relieved. Patient states that he wants help. Patient admitted to the floor on the CIWA scale. Was receiving large amounts of ativan and was transferred to the ICU for closer monitoring.  : received haldol last night for agitation. States his N/V, tremors and overall wellbeing has improved since yesterday. : received precedex last night for agitation. Feeling better overall. Tolerating PO intake. Pt was transferred out of ICU and moved to [address] [place] [address].

## 2019-09-03 ENCOUNTER — OFFICE VISIT (OUTPATIENT)
Dept: FAMILY MEDICINE CLINIC | Age: 44
End: 2019-09-03
Payer: MEDICARE

## 2019-09-03 VITALS
DIASTOLIC BLOOD PRESSURE: 74 MMHG | BODY MASS INDEX: 17.43 KG/M2 | SYSTOLIC BLOOD PRESSURE: 120 MMHG | WEIGHT: 125 LBS | RESPIRATION RATE: 18 BRPM | TEMPERATURE: 98 F | HEART RATE: 66 BPM

## 2019-09-03 DIAGNOSIS — E87.6 HYPOKALEMIA: ICD-10-CM

## 2019-09-03 DIAGNOSIS — R63.4 WEIGHT LOSS: ICD-10-CM

## 2019-09-03 DIAGNOSIS — F10.931 ALCOHOL WITHDRAWAL SYNDROME, WITH DELIRIUM (HCC): ICD-10-CM

## 2019-09-03 DIAGNOSIS — Z09 HOSPITAL DISCHARGE FOLLOW-UP: Primary | ICD-10-CM

## 2019-09-03 PROCEDURE — 1111F DSCHRG MED/CURRENT MED MERGE: CPT | Performed by: NURSE PRACTITIONER

## 2019-09-03 PROCEDURE — 99214 OFFICE O/P EST MOD 30 MIN: CPT | Performed by: NURSE PRACTITIONER

## 2019-09-03 RX ORDER — ASPIRIN 325 MG/1
TABLET, FILM COATED ORAL
Refills: 0 | COMMUNITY
Start: 2019-08-29 | End: 2019-09-29

## 2019-09-03 ASSESSMENT — ENCOUNTER SYMPTOMS
COUGH: 0
ALLERGIC/IMMUNOLOGIC NEGATIVE: 1
RHINORRHEA: 0
VOMITING: 0
SORE THROAT: 0
TROUBLE SWALLOWING: 0
WHEEZING: 0
CONSTIPATION: 0
EYE REDNESS: 0
ABDOMINAL PAIN: 0
EYE PAIN: 0
EYE DISCHARGE: 0
DIARRHEA: 0
BACK PAIN: 0
SHORTNESS OF BREATH: 0
NAUSEA: 0

## 2019-09-03 NOTE — PROGRESS NOTES
TABS take 1 tablet by mouth once daily  0    famotidine (PEPCID) 20 MG tablet Take 1 tablet by mouth 2 times daily 60 tablet 3    vitamin B-1 100 MG tablet Take 1 tablet by mouth daily 30 tablet 3    erythromycin (ROMYCIN) 5 MG/GM ophthalmic ointment Place into the right eye every 6 hours as needed (eye irritation) 1 Tube 0    traZODone (DESYREL) 100 MG tablet Take 1 tablet by mouth nightly 90 tablet 1     No current facility-administered medications for this visit. Review of Systems   Constitutional: Negative for activity change, fatigue and fever. HENT: Negative for congestion, ear pain, rhinorrhea, sore throat and trouble swallowing. Eyes: Negative for pain, discharge and redness. Respiratory: Negative for cough, shortness of breath and wheezing. Cardiovascular: Negative. Negative for chest pain. Gastrointestinal: Negative for abdominal pain, constipation, diarrhea, nausea and vomiting. Endocrine: Negative. Genitourinary: Negative for dysuria, frequency and urgency. Musculoskeletal: Negative for arthralgias, back pain and myalgias. Skin: Negative for rash. Allergic/Immunologic: Negative. Neurological: Negative for dizziness, tremors, weakness and headaches. Hematological: Negative. Psychiatric/Behavioral: Negative for dysphoric mood, self-injury and sleep disturbance. The patient is not nervous/anxious. OBJECTIVE     /74 (Site: Right Upper Arm)   Pulse 66   Temp 98 °F (36.7 °C) (Oral)   Resp 18   Wt 125 lb (56.7 kg)   BMI 17.43 kg/m²     Wt Readings from Last 3 Encounters:   09/03/19 125 lb (56.7 kg)   08/29/19 124 lb 4.8 oz (56.4 kg)   03/05/19 139 lb 3.2 oz (63.1 kg)     Body mass index is 17.43 kg/m². Physical Exam   Constitutional: He is oriented to person, place, and time. Vital signs are normal. He is cooperative. No distress.    HENT:   Right Ear: Hearing and external ear normal.   Left Ear: Hearing and external ear normal.   Nose: Nose Yes      Systolic Blood Pressure: 693 mmHg      Is BP treated: No      HDL Cholesterol: 53 mg/dL      Total Cholesterol: 194 mg/dL    Lab Results   Component Value Date     08/29/2019    K 3.3 (L) 08/29/2019     08/29/2019    CO2 24 08/29/2019    BUN 5 (L) 08/29/2019    CREATININE 0.4 08/29/2019    GLUCOSE 101 08/29/2019    CALCIUM 8.6 08/29/2019    PROT 5.5 (L) 08/29/2019    LABALBU 3.2 (L) 08/29/2019    BILITOT 0.4 08/29/2019    ALKPHOS 121 08/29/2019    AST 63 (H) 08/29/2019    ALT 49 08/29/2019    LABGLOM >90 08/29/2019       No results found for: LABMICR, [id number]    Lab Results   Component Value Date    TSH 2.490 08/24/2019       Lab Results   Component Value Date    WBC 6.1 08/29/2019    HGB 12.6 (L) 08/29/2019    HCT 37.2 (L) 08/29/2019    MCV 98.2 (H) 08/29/2019     08/29/2019       No results found for: PSA, PSADIA    Immunization History   Administered Date(s) Administered    Influenza, Quadv, IM, PF (6 mo and older Fluzone, Flulaval, Fluarix, and 3 yrs and older Afluria) 11/05/2018    Pneumococcal Polysaccharide ([id number]) 11/05/2018    Tdap (Boostrix, Adacel) 11/27/2018       Health Maintenance   Topic Date Due    Flu vaccine (1) 09/01/2019    Lipid screen  11/01/2023    DTaP/Tdap/Td vaccine (2 - Td) 11/27/2028    Pneumococcal 0-64 years Vaccine  Completed    HIV screen  Completed       No future appointments. ASSESSMENT      Patient appears malnourished, has abnormal dentition, and diminished breath sounds. Other physical examination fairly unremarkable. Diagnosis Orders   1. Hospital discharge follow-up     2. Hypokalemia     3. Alcohol withdrawal syndrome, with delirium ([address]     4. Weight loss         PLAN      Discussed at length with the patient the need for a support system and potentially a support group as family are moving out of the area.   Patient is not interested in this at this time he states he is to an independent and has too much pride to go to \"those group meetings\". When asked about what his plan is to prevent a future relapse, he states, \"It was just a bad choice by me and I control myself and my choices. I won't make a bad choice again. \" When attempting to discuss a better relapse prevention plan with patient, he expresses no interest and states he will control his actions. Discussed with patient the need to check a potassium level and refuses today. Discussed checking a potassium level in a month to keep an eye on his level and he states he is not interested in that. Education was provided to patient on signs and symptoms of hypokalemia. Encouraged patient to take someone with him to the store to help him read food labels and pick healthy choices with adequate nutritional value. Gave patient samples of Ensure and encouraged him to drink 1-2/day. He states that he has food stamps and should be able to get Ensure at the store. Encouraged patient to take his weight daily or weekly to assess for weight gain. Patient is in agreeance for transfer of care to [address] [place] [address] as he states he lives in walking distance to that practice and would be able to go to appointments more easily there.     Electronically signed by JACQUE Arrieta CNP on 9/3/2019 at 10:33 AM

## 2019-09-16 ENCOUNTER — TELEPHONE (OUTPATIENT)
Dept: FAMILY MEDICINE CLINIC | Age: 44
End: 2019-09-16

## 2019-09-16 DIAGNOSIS — F10.931 ALCOHOL WITHDRAWAL SYNDROME, WITH DELIRIUM (HCC): Primary | ICD-10-CM

## 2019-09-16 DIAGNOSIS — F10.20 THROMBOCYTOPENIA CONCURRENT WITH AND DUE TO ALCOHOLISM (HCC): ICD-10-CM

## 2019-09-16 DIAGNOSIS — S05.8X2D TRAUMATIC COMMOTIO RETINAE OF LEFT EYE, SUBSEQUENT ENCOUNTER: ICD-10-CM

## 2019-09-16 DIAGNOSIS — K52.9 DIARRHEA DUE TO ALCOHOL INTAKE: ICD-10-CM

## 2019-09-16 DIAGNOSIS — E87.6 HYPOKALEMIA: ICD-10-CM

## 2019-09-16 DIAGNOSIS — D69.59 THROMBOCYTOPENIA CONCURRENT WITH AND DUE TO ALCOHOLISM (HCC): ICD-10-CM

## 2019-09-16 DIAGNOSIS — H54.10 BLINDNESS AND LOW VISION: ICD-10-CM

## 2019-09-22 ENCOUNTER — HOSPITAL ENCOUNTER (EMERGENCY)
Age: 44
Discharge: HOME | End: 2019-09-22
Attending: FAMILY MEDICINE
Payer: MEDICARE

## 2019-09-22 VITALS
SYSTOLIC BLOOD PRESSURE: 116 MMHG | RESPIRATION RATE: 21 BRPM | WEIGHT: 113 LBS | TEMPERATURE: 98.3 F | HEART RATE: 92 BPM | OXYGEN SATURATION: 98 % | BODY MASS INDEX: 15.82 KG/M2 | DIASTOLIC BLOOD PRESSURE: 82 MMHG | HEIGHT: 71 IN

## 2019-09-22 DIAGNOSIS — F10.930 ALCOHOL WITHDRAWAL SYNDROME WITHOUT COMPLICATION (HCC): Primary | ICD-10-CM

## 2019-09-22 DIAGNOSIS — R42 DIZZINESS: ICD-10-CM

## 2019-09-22 DIAGNOSIS — F41.1 ANXIETY STATE: ICD-10-CM

## 2019-09-22 LAB
ALBUMIN SERPL-MCNC: 4.1 G/DL (ref 3.5–5.1)
ALP BLD-CCNC: 83 U/L (ref 38–126)
ALT SERPL-CCNC: 33 U/L (ref 11–66)
ANION GAP SERPL CALCULATED.3IONS-SCNC: 15 MEQ/L (ref 8–16)
AST SERPL-CCNC: 48 U/L (ref 5–40)
BASOPHILS # BLD: 1.9 %
BASOPHILS ABSOLUTE: 0.1 THOU/MM3 (ref 0–0.1)
BILIRUB SERPL-MCNC: 0.3 MG/DL (ref 0.3–1.2)
BUN BLDV-MCNC: 3 MG/DL (ref 7–22)
CALCIUM SERPL-MCNC: 9 MG/DL (ref 8.5–10.5)
CHLORIDE BLD-SCNC: 103 MEQ/L (ref 98–111)
CO2: 23 MEQ/L (ref 23–33)
CREAT SERPL-MCNC: 0.4 MG/DL (ref 0.4–1.2)
EKG ATRIAL RATE: 104 BPM
EKG P AXIS: 72 DEGREES
EKG P-R INTERVAL: 164 MS
EKG Q-T INTERVAL: 350 MS
EKG QRS DURATION: 92 MS
EKG QTC CALCULATION (BAZETT): 460 MS
EKG R AXIS: 93 DEGREES
EKG T AXIS: 69 DEGREES
EKG VENTRICULAR RATE: 104 BPM
EOSINOPHIL # BLD: 1.9 %
EOSINOPHILS ABSOLUTE: 0.1 THOU/MM3 (ref 0–0.4)
ERYTHROCYTE [DISTWIDTH] IN BLOOD BY AUTOMATED COUNT: 14.4 % (ref 11.5–14.5)
ERYTHROCYTE [DISTWIDTH] IN BLOOD BY AUTOMATED COUNT: 53.2 FL (ref 35–45)
GFR SERPL CREATININE-BSD FRML MDRD: > 90 ML/MIN/1.73M2
GLUCOSE BLD-MCNC: 104 MG/DL (ref 70–108)
HCT VFR BLD CALC: 38.5 % (ref 42–52)
HEMOGLOBIN: 12.9 GM/DL (ref 14–18)
IMMATURE GRANS (ABS): 0.02 THOU/MM3 (ref 0–0.07)
IMMATURE GRANULOCYTES: 0 %
LYMPHOCYTES # BLD: 41 %
LYMPHOCYTES ABSOLUTE: 2.2 THOU/MM3 (ref 1–4.8)
MAGNESIUM: 1.6 MG/DL (ref 1.6–2.4)
MCH RBC QN AUTO: 34.1 PG (ref 26–33)
MCHC RBC AUTO-ENTMCNC: 33.5 GM/DL (ref 32.2–35.5)
MCV RBC AUTO: 101.9 FL (ref 80–94)
MONOCYTES # BLD: 14.4 %
MONOCYTES ABSOLUTE: 0.8 THOU/MM3 (ref 0.4–1.3)
NUCLEATED RED BLOOD CELLS: 0 /100 WBC
OSMOLALITY CALCULATION: 278.1 MOSMOL/KG (ref 275–300)
PLATELET # BLD: 112 THOU/MM3 (ref 130–400)
PMV BLD AUTO: 9.3 FL (ref 9.4–12.4)
POTASSIUM REFLEX MAGNESIUM: 3.3 MEQ/L (ref 3.5–5.2)
RBC # BLD: 3.78 MILL/MM3 (ref 4.7–6.1)
SEG NEUTROPHILS: 40.4 %
SEGMENTED NEUTROPHILS ABSOLUTE COUNT: 2.2 THOU/MM3 (ref 1.8–7.7)
SODIUM BLD-SCNC: 141 MEQ/L (ref 135–145)
TOTAL PROTEIN: 6.2 G/DL (ref 6.1–8)
WBC # BLD: 5.4 THOU/MM3 (ref 4.8–10.8)

## 2019-09-22 PROCEDURE — 80053 COMPREHEN METABOLIC PANEL: CPT

## 2019-09-22 PROCEDURE — 99284 EMERGENCY DEPT VISIT MOD MDM: CPT

## 2019-09-22 PROCEDURE — 2580000003 HC RX 258: Performed by: FAMILY MEDICINE

## 2019-09-22 PROCEDURE — 85025 COMPLETE CBC W/AUTO DIFF WBC: CPT

## 2019-09-22 PROCEDURE — 93005 ELECTROCARDIOGRAM TRACING: CPT | Performed by: FAMILY MEDICINE

## 2019-09-22 PROCEDURE — 36415 COLL VENOUS BLD VENIPUNCTURE: CPT

## 2019-09-22 PROCEDURE — 83735 ASSAY OF MAGNESIUM: CPT

## 2019-09-22 PROCEDURE — 2500000003 HC RX 250 WO HCPCS: Performed by: FAMILY MEDICINE

## 2019-09-22 PROCEDURE — 6370000000 HC RX 637 (ALT 250 FOR IP): Performed by: FAMILY MEDICINE

## 2019-09-22 PROCEDURE — 6360000002 HC RX W HCPCS: Performed by: FAMILY MEDICINE

## 2019-09-22 RX ORDER — SODIUM CHLORIDE 0.9 % (FLUSH) 0.9 %
10 SYRINGE (ML) INJECTION PRN
Status: DISCONTINUED | OUTPATIENT
Start: 2019-09-22 | End: 2019-09-22 | Stop reason: HOSPADM

## 2019-09-22 RX ORDER — MECLIZINE HYDROCHLORIDE CHEWABLE TABLETS 25 MG/1
25 TABLET, CHEWABLE ORAL ONCE
Status: COMPLETED | OUTPATIENT
Start: 2019-09-22 | End: 2019-09-22

## 2019-09-22 RX ORDER — MECLIZINE HYDROCHLORIDE 25 MG/1
25 TABLET ORAL 3 TIMES DAILY PRN
Qty: 15 TABLET | Refills: 0 | Status: SHIPPED | OUTPATIENT
Start: 2019-09-22 | End: 2019-09-29

## 2019-09-22 RX ORDER — SODIUM CHLORIDE 0.9 % (FLUSH) 0.9 %
10 SYRINGE (ML) INJECTION EVERY 12 HOURS SCHEDULED
Status: DISCONTINUED | OUTPATIENT
Start: 2019-09-22 | End: 2019-09-22 | Stop reason: HOSPADM

## 2019-09-22 RX ADMIN — FOLIC ACID: 5 INJECTION, SOLUTION INTRAMUSCULAR; INTRAVENOUS; SUBCUTANEOUS at 18:43

## 2019-09-22 RX ADMIN — MECLIZINE HCL 25 MG: 25 TABLET, CHEWABLE ORAL at 18:29

## 2019-09-22 ASSESSMENT — SLEEP AND FATIGUE QUESTIONNAIRES
DO YOU HAVE DIFFICULTY SLEEPING: NO
DO YOU USE A SLEEP AID: NO

## 2019-09-22 NOTE — ED PROVIDER NOTES
(1.803 m)   Wt 113 lb (51.3 kg)   SpO2 98%   BMI 15.76 kg/m²    Constitutional: Well developed, well nourished, no acute distress   Eyes: PERRL, conjunctiva normal   HENT: Atraumatic, external ears normal, nose normal   Neck: supple, No JVD   Respiratory: No respiratory distress, normal breath sounds   Cardiovascular: Tachycardic rate, normal rhythm, no murmurs   GI: Soft, nondistended, normal bowel sounds, nontender   Musculoskeletal: No edema, no deformities   Integument: Well hydrated   Neurologic: Awake alert and oriented, no slurred speech, normal gross motor coordination and strength. Normal Gait. CN 3-12 intatct. Psychiatric: Flat affect, does not make good eye contact due to his blindness, denies SI/HI or psychosis     EKG EKG Interpretation. EKG Interpretation    Interpreted by emergency department physician on 9/22/19 [id number]    Rhythm: normal sinus   Rate: 104 bpm  Axis: normal  Ectopy: none  Conduction: normal  ST Segments: no acute change  T Waves: no acute change  Q Waves: none    Clinical Impression: non-specific EKG      ED COURSE & MEDICAL DECISION MAKING   Pertinent Labs studies reviewed and interpreted. (See chart for details)   Consultation: Mental health Professional consulted in the emergency Department   Differential diagnoses: Metabolic emergency, infection, primary neurologic emergency, psychosis, behavioral psychiatric problem, toxidrome, illicit drug use, other   The patient is medically stable     FINAL IMPRESSION   1. Alcohol withdrawal syndrome without complication ([address]    2. Anxiety state    3. Dizziness         PLAN   I do think pt is in mild alcohol withdrawal. His last imbibement was this morning. Pt typically drinks 4 tall beers daily. He was admitted to [facility] 1-2 weeks ago for EtOH withdrawal. He denies any SI/HI or psychosis. I do not necessarily think that pt requires emergent behavioral health stabilization.  Pt does appear to have some depression for his

## 2019-09-24 ENCOUNTER — TELEPHONE (OUTPATIENT)
Dept: FAMILY MEDICINE CLINIC | Age: 44
End: 2019-09-24

## 2019-09-29 ENCOUNTER — APPOINTMENT (OUTPATIENT)
Dept: GENERAL RADIOLOGY | Age: 44
End: 2019-09-29
Payer: MEDICARE

## 2019-09-29 ENCOUNTER — HOSPITAL ENCOUNTER (EMERGENCY)
Age: 44
Discharge: HOME | End: 2019-09-30
Payer: MEDICARE

## 2019-09-29 DIAGNOSIS — F10.920 ACUTE ALCOHOLIC INTOXICATION WITHOUT COMPLICATION (HCC): Primary | ICD-10-CM

## 2019-09-29 LAB
ALBUMIN SERPL-MCNC: 4 G/DL (ref 3.5–5.1)
ALP BLD-CCNC: 96 U/L (ref 38–126)
ALT SERPL-CCNC: 32 U/L (ref 11–66)
ANION GAP SERPL CALCULATED.3IONS-SCNC: 17 MEQ/L (ref 8–16)
AST SERPL-CCNC: 62 U/L (ref 5–40)
BASOPHILS # BLD: 4.6 %
BASOPHILS ABSOLUTE: 0.2 THOU/MM3 (ref 0–0.1)
BILIRUB SERPL-MCNC: 0.3 MG/DL (ref 0.3–1.2)
BILIRUBIN DIRECT: < 0.2 MG/DL (ref 0–0.3)
BUN BLDV-MCNC: 5 MG/DL (ref 7–22)
CALCIUM SERPL-MCNC: 8.6 MG/DL (ref 8.5–10.5)
CHLORIDE BLD-SCNC: 102 MEQ/L (ref 98–111)
CO2: 23 MEQ/L (ref 23–33)
CREAT SERPL-MCNC: 0.4 MG/DL (ref 0.4–1.2)
EKG ATRIAL RATE: 105 BPM
EKG P AXIS: 83 DEGREES
EKG P-R INTERVAL: 158 MS
EKG Q-T INTERVAL: 336 MS
EKG QRS DURATION: 90 MS
EKG QTC CALCULATION (BAZETT): 444 MS
EKG R AXIS: 108 DEGREES
EKG T AXIS: 69 DEGREES
EKG VENTRICULAR RATE: 105 BPM
EOSINOPHIL # BLD: 3.8 %
EOSINOPHILS ABSOLUTE: 0.2 THOU/MM3 (ref 0–0.4)
ERYTHROCYTE [DISTWIDTH] IN BLOOD BY AUTOMATED COUNT: 16.2 % (ref 11.5–14.5)
ERYTHROCYTE [DISTWIDTH] IN BLOOD BY AUTOMATED COUNT: 61.9 FL (ref 35–45)
ETHYL ALCOHOL, SERUM: 0.3 %
GFR SERPL CREATININE-BSD FRML MDRD: > 90 ML/MIN/1.73M2
GLUCOSE BLD-MCNC: 124 MG/DL (ref 70–108)
HCT VFR BLD CALC: 43.5 % (ref 42–52)
HEMOGLOBIN: 14.5 GM/DL (ref 14–18)
IMMATURE GRANS (ABS): 0.01 THOU/MM3 (ref 0–0.07)
IMMATURE GRANULOCYTES: 0.2 %
INR BLD: 0.94 (ref 0.85–1.13)
LIPASE: 22.3 U/L (ref 5.6–51.3)
LYMPHOCYTES # BLD: 46.2 %
LYMPHOCYTES ABSOLUTE: 2.1 THOU/MM3 (ref 1–4.8)
MAGNESIUM: 1.9 MG/DL (ref 1.6–2.4)
MCH RBC QN AUTO: 34.4 PG (ref 26–33)
MCHC RBC AUTO-ENTMCNC: 33.3 GM/DL (ref 32.2–35.5)
MCV RBC AUTO: 103.3 FL (ref 80–94)
MONOCYTES # BLD: 16.8 %
MONOCYTES ABSOLUTE: 0.8 THOU/MM3 (ref 0.4–1.3)
NUCLEATED RED BLOOD CELLS: 0 /100 WBC
OSMOLALITY CALCULATION: 281.8 MOSMOL/KG (ref 275–300)
PLATELET # BLD: 265 THOU/MM3 (ref 130–400)
PMV BLD AUTO: 9 FL (ref 9.4–12.4)
POTASSIUM REFLEX MAGNESIUM: 3.4 MEQ/L (ref 3.5–5.2)
RBC # BLD: 4.21 MILL/MM3 (ref 4.7–6.1)
SEG NEUTROPHILS: 28.4 %
SEGMENTED NEUTROPHILS ABSOLUTE COUNT: 1.3 THOU/MM3 (ref 1.8–7.7)
SODIUM BLD-SCNC: 142 MEQ/L (ref 135–145)
TOTAL PROTEIN: 6.6 G/DL (ref 6.1–8)
WBC # BLD: 4.5 THOU/MM3 (ref 4.8–10.8)

## 2019-09-29 PROCEDURE — 80048 BASIC METABOLIC PNL TOTAL CA: CPT

## 2019-09-29 PROCEDURE — 36415 COLL VENOUS BLD VENIPUNCTURE: CPT

## 2019-09-29 PROCEDURE — 83690 ASSAY OF LIPASE: CPT

## 2019-09-29 PROCEDURE — 83735 ASSAY OF MAGNESIUM: CPT

## 2019-09-29 PROCEDURE — 6360000002 HC RX W HCPCS: Performed by: NURSE PRACTITIONER

## 2019-09-29 PROCEDURE — 96375 TX/PRO/DX INJ NEW DRUG ADDON: CPT

## 2019-09-29 PROCEDURE — 96376 TX/PRO/DX INJ SAME DRUG ADON: CPT

## 2019-09-29 PROCEDURE — G0480 DRUG TEST DEF 1-7 CLASSES: HCPCS

## 2019-09-29 PROCEDURE — 99284 EMERGENCY DEPT VISIT MOD MDM: CPT

## 2019-09-29 PROCEDURE — 96374 THER/PROPH/DIAG INJ IV PUSH: CPT

## 2019-09-29 PROCEDURE — 85025 COMPLETE CBC W/AUTO DIFF WBC: CPT

## 2019-09-29 PROCEDURE — 93005 ELECTROCARDIOGRAM TRACING: CPT | Performed by: NURSE PRACTITIONER

## 2019-09-29 PROCEDURE — 85610 PROTHROMBIN TIME: CPT

## 2019-09-29 PROCEDURE — 80076 HEPATIC FUNCTION PANEL: CPT

## 2019-09-29 PROCEDURE — 74018 RADEX ABDOMEN 1 VIEW: CPT

## 2019-09-29 PROCEDURE — 6370000000 HC RX 637 (ALT 250 FOR IP): Performed by: NURSE PRACTITIONER

## 2019-09-29 RX ORDER — LORAZEPAM 2 MG/ML
0.5 INJECTION INTRAMUSCULAR ONCE
Status: COMPLETED | OUTPATIENT
Start: 2019-09-29 | End: 2019-09-29

## 2019-09-29 RX ORDER — LORAZEPAM 2 MG/ML
1 INJECTION INTRAMUSCULAR ONCE
Status: COMPLETED | OUTPATIENT
Start: 2019-09-29 | End: 2019-09-29

## 2019-09-29 RX ORDER — THIAMINE HYDROCHLORIDE 100 MG/ML
100 INJECTION, SOLUTION INTRAMUSCULAR; INTRAVENOUS ONCE
Status: COMPLETED | OUTPATIENT
Start: 2019-09-29 | End: 2019-09-29

## 2019-09-29 RX ORDER — ONDANSETRON 2 MG/ML
4 INJECTION INTRAMUSCULAR; INTRAVENOUS ONCE
Status: COMPLETED | OUTPATIENT
Start: 2019-09-29 | End: 2019-09-29

## 2019-09-29 RX ORDER — MECLIZINE HYDROCHLORIDE CHEWABLE TABLETS 25 MG/1
25 TABLET, CHEWABLE ORAL ONCE
Status: COMPLETED | OUTPATIENT
Start: 2019-09-29 | End: 2019-09-29

## 2019-09-29 RX ADMIN — LORAZEPAM 1 MG: 2 INJECTION, SOLUTION INTRAMUSCULAR; INTRAVENOUS at 22:35

## 2019-09-29 RX ADMIN — LORAZEPAM 1 MG: 2 INJECTION INTRAMUSCULAR; INTRAVENOUS at 16:05

## 2019-09-29 RX ADMIN — MECLIZINE HCL 25 MG: 25 TABLET, CHEWABLE ORAL at 13:23

## 2019-09-29 RX ADMIN — ONDANSETRON 4 MG: 2 INJECTION INTRAMUSCULAR; INTRAVENOUS at 13:22

## 2019-09-29 RX ADMIN — LORAZEPAM 0.5 MG: 2 INJECTION INTRAMUSCULAR; INTRAVENOUS at 13:24

## 2019-09-29 RX ADMIN — THIAMINE HYDROCHLORIDE 100 MG: 100 INJECTION, SOLUTION INTRAMUSCULAR; INTRAVENOUS at 13:24

## 2019-09-29 ASSESSMENT — ENCOUNTER SYMPTOMS
BACK PAIN: 0
COUGH: 0
WHEEZING: 0
DIARRHEA: 0
EYE REDNESS: 0
EYE DISCHARGE: 0
VOMITING: 0
SORE THROAT: 0
ABDOMINAL PAIN: 0
SHORTNESS OF BREATH: 0
NAUSEA: 1
RHINORRHEA: 0

## 2019-09-29 ASSESSMENT — PAIN SCALES - GENERAL
PAINLEVEL_OUTOF10: 2
PAINLEVEL_OUTOF10: 4

## 2019-09-29 ASSESSMENT — PAIN DESCRIPTION - DESCRIPTORS
DESCRIPTORS: ACHING
DESCRIPTORS: ACHING

## 2019-09-29 ASSESSMENT — PAIN DESCRIPTION - LOCATION: LOCATION: ABDOMEN

## 2019-09-29 ASSESSMENT — PAIN DESCRIPTION - PAIN TYPE
TYPE: ACUTE PAIN
TYPE: ACUTE PAIN

## 2019-09-30 ENCOUNTER — HOSPITAL ENCOUNTER (INPATIENT)
Age: 44
LOS: 3 days | Discharge: HOME | End: 2019-10-03
Attending: FAMILY MEDICINE | Admitting: INTERNAL MEDICINE
Payer: MEDICARE

## 2019-09-30 VITALS
WEIGHT: 113 LBS | DIASTOLIC BLOOD PRESSURE: 94 MMHG | RESPIRATION RATE: 17 BRPM | SYSTOLIC BLOOD PRESSURE: 134 MMHG | OXYGEN SATURATION: 98 % | BODY MASS INDEX: 15.82 KG/M2 | TEMPERATURE: 98 F | HEART RATE: 55 BPM | HEIGHT: 71 IN

## 2019-09-30 DIAGNOSIS — F10.939 ALCOHOL WITHDRAWAL SYNDROME WITH COMPLICATION (HCC): Primary | ICD-10-CM

## 2019-09-30 DIAGNOSIS — R42 DIZZINESS: ICD-10-CM

## 2019-09-30 PROBLEM — F10.930 ALCOHOL WITHDRAWAL SYNDROME, UNCOMPLICATED (HCC): Status: ACTIVE | Noted: 2019-09-30

## 2019-09-30 PROBLEM — S06.9XAA TBI (TRAUMATIC BRAIN INJURY): Status: ACTIVE | Noted: 2019-09-30

## 2019-09-30 PROBLEM — F10.930 ALCOHOL WITHDRAWAL SYNDROME WITHOUT COMPLICATION (HCC): Status: ACTIVE | Noted: 2019-09-30

## 2019-09-30 LAB
ACETAMINOPHEN LEVEL: < 5 UG/ML (ref 0–20)
ALBUMIN SERPL-MCNC: 4.2 G/DL (ref 3.5–5.1)
ALP BLD-CCNC: 95 U/L (ref 38–126)
ALT SERPL-CCNC: 36 U/L (ref 11–66)
AMPHETAMINE+METHAMPHETAMINE URINE SCREEN: NEGATIVE
ANION GAP SERPL CALCULATED.3IONS-SCNC: 14 MEQ/L (ref 8–16)
AST SERPL-CCNC: 84 U/L (ref 5–40)
BARBITURATE QUANTITATIVE URINE: NEGATIVE
BASOPHILS # BLD: 1.9 %
BASOPHILS ABSOLUTE: 0.1 THOU/MM3 (ref 0–0.1)
BENZODIAZEPINE QUANTITATIVE URINE: POSITIVE
BILIRUB SERPL-MCNC: 0.8 MG/DL (ref 0.3–1.2)
BILIRUBIN DIRECT: < 0.2 MG/DL (ref 0–0.3)
BILIRUBIN URINE: NEGATIVE
BLOOD, URINE: NEGATIVE
BUN BLDV-MCNC: 4 MG/DL (ref 7–22)
CALCIUM SERPL-MCNC: 9.2 MG/DL (ref 8.5–10.5)
CANNABINOID QUANTITATIVE URINE: NEGATIVE
CHARACTER, URINE: CLEAR
CHLORIDE BLD-SCNC: 103 MEQ/L (ref 98–111)
CO2: 23 MEQ/L (ref 23–33)
COCAINE METABOLITE QUANTITATIVE URINE: NEGATIVE
COLOR: YELLOW
CREAT SERPL-MCNC: 0.5 MG/DL (ref 0.4–1.2)
EOSINOPHIL # BLD: 1.4 %
EOSINOPHILS ABSOLUTE: 0.1 THOU/MM3 (ref 0–0.4)
ERYTHROCYTE [DISTWIDTH] IN BLOOD BY AUTOMATED COUNT: 15 % (ref 11.5–14.5)
ERYTHROCYTE [DISTWIDTH] IN BLOOD BY AUTOMATED COUNT: 56.8 FL (ref 35–45)
ETHYL ALCOHOL, SERUM: < 0.01 %
ETHYL ALCOHOL, SERUM: < 0.01 %
GFR SERPL CREATININE-BSD FRML MDRD: > 90 ML/MIN/1.73M2
GLUCOSE BLD-MCNC: 101 MG/DL (ref 70–108)
GLUCOSE URINE: NEGATIVE MG/DL
HCT VFR BLD CALC: 41.6 % (ref 42–52)
HEMOGLOBIN: 13.9 GM/DL (ref 14–18)
IMMATURE GRANS (ABS): 0.02 THOU/MM3 (ref 0–0.07)
IMMATURE GRANULOCYTES: 0.3 %
KETONES, URINE: NEGATIVE
LEUKOCYTE ESTERASE, URINE: NEGATIVE
LIPASE: 29.2 U/L (ref 5.6–51.3)
LYMPHOCYTES # BLD: 22.3 %
LYMPHOCYTES ABSOLUTE: 1.4 THOU/MM3 (ref 1–4.8)
MCH RBC QN AUTO: 34.1 PG (ref 26–33)
MCHC RBC AUTO-ENTMCNC: 33.4 GM/DL (ref 32.2–35.5)
MCV RBC AUTO: 102 FL (ref 80–94)
MONOCYTES # BLD: 11.5 %
MONOCYTES ABSOLUTE: 0.7 THOU/MM3 (ref 0.4–1.3)
NITRITE, URINE: NEGATIVE
NUCLEATED RED BLOOD CELLS: 0 /100 WBC
OPIATES, URINE: NEGATIVE
OSMOLALITY CALCULATION: 276.4 MOSMOL/KG (ref 275–300)
OXYCODONE: NEGATIVE
PH UA: 7 (ref 5–9)
PHENCYCLIDINE QUANTITATIVE URINE: NEGATIVE
PLATELET # BLD: 238 THOU/MM3 (ref 130–400)
PMV BLD AUTO: 9.2 FL (ref 9.4–12.4)
POTASSIUM SERPL-SCNC: 3.2 MEQ/L (ref 3.5–5.2)
PROTEIN UA: NEGATIVE
RBC # BLD: 4.08 MILL/MM3 (ref 4.7–6.1)
SALICYLATE, SERUM: < 0.3 MG/DL (ref 2–10)
SEG NEUTROPHILS: 62.6 %
SEGMENTED NEUTROPHILS ABSOLUTE COUNT: 4.1 THOU/MM3 (ref 1.8–7.7)
SODIUM BLD-SCNC: 140 MEQ/L (ref 135–145)
SPECIFIC GRAVITY, URINE: 1.01 (ref 1–1.03)
TOTAL PROTEIN: 6.5 G/DL (ref 6.1–8)
TROPONIN T: < 0.01 NG/ML
UROBILINOGEN, URINE: 0.2 EU/DL (ref 0–1)
WBC # BLD: 6.5 THOU/MM3 (ref 4.8–10.8)

## 2019-09-30 PROCEDURE — 6360000002 HC RX W HCPCS: Performed by: FAMILY MEDICINE

## 2019-09-30 PROCEDURE — 96374 THER/PROPH/DIAG INJ IV PUSH: CPT

## 2019-09-30 PROCEDURE — 85025 COMPLETE CBC W/AUTO DIFF WBC: CPT

## 2019-09-30 PROCEDURE — G0480 DRUG TEST DEF 1-7 CLASSES: HCPCS

## 2019-09-30 PROCEDURE — 99284 EMERGENCY DEPT VISIT MOD MDM: CPT

## 2019-09-30 PROCEDURE — 2580000003 HC RX 258: Performed by: INTERNAL MEDICINE

## 2019-09-30 PROCEDURE — 99223 1ST HOSP IP/OBS HIGH 75: CPT | Performed by: INTERNAL MEDICINE

## 2019-09-30 PROCEDURE — 83690 ASSAY OF LIPASE: CPT

## 2019-09-30 PROCEDURE — 80053 COMPREHEN METABOLIC PANEL: CPT

## 2019-09-30 PROCEDURE — 84484 ASSAY OF TROPONIN QUANT: CPT

## 2019-09-30 PROCEDURE — 6370000000 HC RX 637 (ALT 250 FOR IP): Performed by: FAMILY MEDICINE

## 2019-09-30 PROCEDURE — 80307 DRUG TEST PRSMV CHEM ANLYZR: CPT

## 2019-09-30 PROCEDURE — 82248 BILIRUBIN DIRECT: CPT

## 2019-09-30 PROCEDURE — 1200000003 HC TELEMETRY R&B

## 2019-09-30 PROCEDURE — 36415 COLL VENOUS BLD VENIPUNCTURE: CPT

## 2019-09-30 PROCEDURE — 6370000000 HC RX 637 (ALT 250 FOR IP): Performed by: INTERNAL MEDICINE

## 2019-09-30 PROCEDURE — 6370000000 HC RX 637 (ALT 250 FOR IP): Performed by: PHYSICIAN ASSISTANT

## 2019-09-30 PROCEDURE — 81003 URINALYSIS AUTO W/O SCOPE: CPT

## 2019-09-30 PROCEDURE — 2580000003 HC RX 258: Performed by: FAMILY MEDICINE

## 2019-09-30 PROCEDURE — 93010 ELECTROCARDIOGRAM REPORT: CPT | Performed by: INTERNAL MEDICINE

## 2019-09-30 RX ORDER — LORAZEPAM 2 MG/ML
2 INJECTION INTRAMUSCULAR
Status: DISCONTINUED | OUTPATIENT
Start: 2019-09-30 | End: 2019-10-01

## 2019-09-30 RX ORDER — POTASSIUM CHLORIDE 20 MEQ/1
40 TABLET, EXTENDED RELEASE ORAL ONCE
Status: COMPLETED | OUTPATIENT
Start: 2019-09-30 | End: 2019-09-30

## 2019-09-30 RX ORDER — POTASSIUM CHLORIDE 7.45 MG/ML
10 INJECTION INTRAVENOUS PRN
Status: DISCONTINUED | OUTPATIENT
Start: 2019-09-30 | End: 2019-10-03 | Stop reason: HOSPADM

## 2019-09-30 RX ORDER — CLONIDINE HYDROCHLORIDE 0.2 MG/1
0.2 TABLET ORAL ONCE
Status: DISCONTINUED | OUTPATIENT
Start: 2019-09-30 | End: 2019-09-30 | Stop reason: HOSPADM

## 2019-09-30 RX ORDER — LORAZEPAM 2 MG/ML
3 INJECTION INTRAMUSCULAR
Status: DISCONTINUED | OUTPATIENT
Start: 2019-09-30 | End: 2019-10-01

## 2019-09-30 RX ORDER — LORAZEPAM 1 MG/1
1 TABLET ORAL
Status: DISCONTINUED | OUTPATIENT
Start: 2019-09-30 | End: 2019-10-01

## 2019-09-30 RX ORDER — LORAZEPAM 2 MG/ML
1 INJECTION INTRAMUSCULAR
Status: DISCONTINUED | OUTPATIENT
Start: 2019-09-30 | End: 2019-10-01

## 2019-09-30 RX ORDER — ONDANSETRON 2 MG/ML
4 INJECTION INTRAMUSCULAR; INTRAVENOUS EVERY 6 HOURS PRN
Status: DISCONTINUED | OUTPATIENT
Start: 2019-09-30 | End: 2019-10-03 | Stop reason: HOSPADM

## 2019-09-30 RX ORDER — MULTIVITAMIN WITH FOLIC ACID 400 MCG
1 TABLET ORAL DAILY
Status: DISCONTINUED | OUTPATIENT
Start: 2019-09-30 | End: 2019-10-03 | Stop reason: HOSPADM

## 2019-09-30 RX ORDER — FAMOTIDINE 20 MG/1
20 TABLET, FILM COATED ORAL 2 TIMES DAILY
Status: DISCONTINUED | OUTPATIENT
Start: 2019-09-30 | End: 2019-10-03 | Stop reason: HOSPADM

## 2019-09-30 RX ORDER — SODIUM CHLORIDE 0.9 % (FLUSH) 0.9 %
10 SYRINGE (ML) INJECTION PRN
Status: DISCONTINUED | OUTPATIENT
Start: 2019-09-30 | End: 2019-10-03 | Stop reason: HOSPADM

## 2019-09-30 RX ORDER — LORAZEPAM 2 MG/ML
1 INJECTION INTRAMUSCULAR ONCE
Status: COMPLETED | OUTPATIENT
Start: 2019-09-30 | End: 2019-09-30

## 2019-09-30 RX ORDER — LORAZEPAM 2 MG/ML
4 INJECTION INTRAMUSCULAR
Status: DISCONTINUED | OUTPATIENT
Start: 2019-09-30 | End: 2019-10-01

## 2019-09-30 RX ORDER — SODIUM CHLORIDE 9 MG/ML
INJECTION, SOLUTION INTRAVENOUS CONTINUOUS
Status: ACTIVE | OUTPATIENT
Start: 2019-09-30 | End: 2019-10-01

## 2019-09-30 RX ORDER — SODIUM CHLORIDE 0.9 % (FLUSH) 0.9 %
10 SYRINGE (ML) INJECTION EVERY 12 HOURS SCHEDULED
Status: DISCONTINUED | OUTPATIENT
Start: 2019-09-30 | End: 2019-10-03 | Stop reason: HOSPADM

## 2019-09-30 RX ORDER — LORAZEPAM 2 MG/1
4 TABLET ORAL
Status: DISCONTINUED | OUTPATIENT
Start: 2019-09-30 | End: 2019-10-01

## 2019-09-30 RX ORDER — LORAZEPAM 2 MG/1
2 TABLET ORAL
Status: DISCONTINUED | OUTPATIENT
Start: 2019-09-30 | End: 2019-10-01

## 2019-09-30 RX ORDER — FOLIC ACID 1 MG/1
1 TABLET ORAL DAILY
Status: DISCONTINUED | OUTPATIENT
Start: 2019-09-30 | End: 2019-10-03 | Stop reason: HOSPADM

## 2019-09-30 RX ORDER — THIAMINE MONONITRATE (VIT B1) 100 MG
100 TABLET ORAL DAILY
Status: DISCONTINUED | OUTPATIENT
Start: 2019-09-30 | End: 2019-10-03 | Stop reason: HOSPADM

## 2019-09-30 RX ORDER — 0.9 % SODIUM CHLORIDE 0.9 %
1000 INTRAVENOUS SOLUTION INTRAVENOUS ONCE
Status: COMPLETED | OUTPATIENT
Start: 2019-09-30 | End: 2019-09-30

## 2019-09-30 RX ORDER — ACETAMINOPHEN 325 MG/1
650 TABLET ORAL EVERY 4 HOURS PRN
Status: DISCONTINUED | OUTPATIENT
Start: 2019-09-30 | End: 2019-10-03 | Stop reason: HOSPADM

## 2019-09-30 RX ADMIN — ACETAMINOPHEN 650 MG: 325 TABLET ORAL at 21:45

## 2019-09-30 RX ADMIN — SODIUM CHLORIDE 1000 ML: 9 INJECTION, SOLUTION INTRAVENOUS at 13:49

## 2019-09-30 RX ADMIN — FAMOTIDINE 20 MG: 20 TABLET ORAL at 21:42

## 2019-09-30 RX ADMIN — Medication 100 MG: at 22:12

## 2019-09-30 RX ADMIN — POTASSIUM CHLORIDE 40 MEQ: 1500 TABLET, EXTENDED RELEASE ORAL at 14:46

## 2019-09-30 RX ADMIN — FOLIC ACID 1 MG: 1 TABLET ORAL at 22:12

## 2019-09-30 RX ADMIN — LORAZEPAM 1 MG: 2 INJECTION, SOLUTION INTRAMUSCULAR; INTRAVENOUS at 15:49

## 2019-09-30 RX ADMIN — THERA TABS 1 TABLET: TAB at 22:12

## 2019-09-30 RX ADMIN — Medication 10 ML: at 21:43

## 2019-09-30 RX ADMIN — SODIUM CHLORIDE: 9 INJECTION, SOLUTION INTRAVENOUS at 21:42

## 2019-09-30 ASSESSMENT — ENCOUNTER SYMPTOMS
VOMITING: 0
ABDOMINAL PAIN: 0
NAUSEA: 0
WHEEZING: 0
SHORTNESS OF BREATH: 0
SORE THROAT: 0
EYE REDNESS: 0
EYE DISCHARGE: 0
COUGH: 0
BACK PAIN: 0
RHINORRHEA: 0
DIARRHEA: 0

## 2019-09-30 ASSESSMENT — PAIN DESCRIPTION - FREQUENCY: FREQUENCY: CONTINUOUS

## 2019-09-30 ASSESSMENT — PAIN DESCRIPTION - ONSET: ONSET: ON-GOING

## 2019-09-30 ASSESSMENT — PAIN DESCRIPTION - ORIENTATION: ORIENTATION: MID

## 2019-09-30 ASSESSMENT — PAIN SCALES - GENERAL: PAINLEVEL_OUTOF10: 3

## 2019-09-30 ASSESSMENT — PAIN - FUNCTIONAL ASSESSMENT: PAIN_FUNCTIONAL_ASSESSMENT: ACTIVITIES ARE NOT PREVENTED

## 2019-09-30 ASSESSMENT — PAIN DESCRIPTION - PAIN TYPE: TYPE: ACUTE PAIN

## 2019-09-30 ASSESSMENT — PAIN DESCRIPTION - PROGRESSION: CLINICAL_PROGRESSION: GRADUALLY WORSENING

## 2019-09-30 ASSESSMENT — PAIN DESCRIPTION - DESCRIPTORS: DESCRIPTORS: ACHING

## 2019-09-30 ASSESSMENT — PAIN DESCRIPTION - LOCATION: LOCATION: HEAD

## 2019-10-01 ENCOUNTER — TELEPHONE (OUTPATIENT)
Dept: FAMILY MEDICINE CLINIC | Age: 44
End: 2019-10-01

## 2019-10-01 LAB
ANION GAP SERPL CALCULATED.3IONS-SCNC: 12 MEQ/L (ref 8–16)
BUN BLDV-MCNC: 4 MG/DL (ref 7–22)
CALCIUM SERPL-MCNC: 8.6 MG/DL (ref 8.5–10.5)
CHLORIDE BLD-SCNC: 106 MEQ/L (ref 98–111)
CO2: 21 MEQ/L (ref 23–33)
CREAT SERPL-MCNC: 0.3 MG/DL (ref 0.4–1.2)
GFR SERPL CREATININE-BSD FRML MDRD: > 90 ML/MIN/1.73M2
GLUCOSE BLD-MCNC: 112 MG/DL (ref 70–108)
MAGNESIUM: 1.5 MG/DL (ref 1.6–2.4)
POTASSIUM REFLEX MAGNESIUM: 3.6 MEQ/L (ref 3.5–5.2)
SODIUM BLD-SCNC: 139 MEQ/L (ref 135–145)

## 2019-10-01 PROCEDURE — 6370000000 HC RX 637 (ALT 250 FOR IP): Performed by: PHYSICIAN ASSISTANT

## 2019-10-01 PROCEDURE — 6370000000 HC RX 637 (ALT 250 FOR IP): Performed by: INTERNAL MEDICINE

## 2019-10-01 PROCEDURE — 99232 SBSQ HOSP IP/OBS MODERATE 35: CPT | Performed by: PHYSICIAN ASSISTANT

## 2019-10-01 PROCEDURE — 83735 ASSAY OF MAGNESIUM: CPT

## 2019-10-01 PROCEDURE — 2580000003 HC RX 258: Performed by: PHYSICIAN ASSISTANT

## 2019-10-01 PROCEDURE — 6360000002 HC RX W HCPCS: Performed by: PHYSICIAN ASSISTANT

## 2019-10-01 PROCEDURE — 80048 BASIC METABOLIC PNL TOTAL CA: CPT

## 2019-10-01 PROCEDURE — 36415 COLL VENOUS BLD VENIPUNCTURE: CPT

## 2019-10-01 PROCEDURE — 1200000003 HC TELEMETRY R&B

## 2019-10-01 PROCEDURE — 2580000003 HC RX 258: Performed by: INTERNAL MEDICINE

## 2019-10-01 RX ORDER — PHENOBARBITAL 32.4 MG/1
64.8 TABLET ORAL 2 TIMES DAILY
Status: COMPLETED | OUTPATIENT
Start: 2019-10-01 | End: 2019-10-02

## 2019-10-01 RX ORDER — PHENOBARBITAL 32.4 MG/1
32.4 TABLET ORAL 2 TIMES DAILY
Status: DISCONTINUED | OUTPATIENT
Start: 2019-10-02 | End: 2019-10-03 | Stop reason: HOSPADM

## 2019-10-01 RX ADMIN — THERA TABS 1 TABLET: TAB at 09:22

## 2019-10-01 RX ADMIN — PHENOBARBITAL SODIUM 216.45 MG: 65 INJECTION INTRAMUSCULAR; INTRAVENOUS at 11:15

## 2019-10-01 RX ADMIN — FAMOTIDINE 20 MG: 20 TABLET ORAL at 09:22

## 2019-10-01 RX ADMIN — Medication 10 ML: at 11:17

## 2019-10-01 RX ADMIN — FOLIC ACID 1 MG: 1 TABLET ORAL at 09:22

## 2019-10-01 RX ADMIN — Medication 10 ML: at 19:42

## 2019-10-01 RX ADMIN — LORAZEPAM 1 MG: 1 TABLET ORAL at 04:24

## 2019-10-01 RX ADMIN — PHENOBARBITAL SODIUM 216.45 MG: 65 INJECTION INTRAMUSCULAR; INTRAVENOUS at 19:42

## 2019-10-01 RX ADMIN — PHENOBARBITAL 64.8 MG: 32.4 TABLET ORAL at 23:21

## 2019-10-01 RX ADMIN — Medication 100 MG: at 09:22

## 2019-10-01 RX ADMIN — FAMOTIDINE 20 MG: 20 TABLET ORAL at 19:42

## 2019-10-01 ASSESSMENT — PAIN SCALES - GENERAL
PAINLEVEL_OUTOF10: 0
PAINLEVEL_OUTOF10: 0

## 2019-10-02 PROCEDURE — 1200000003 HC TELEMETRY R&B

## 2019-10-02 PROCEDURE — 99232 SBSQ HOSP IP/OBS MODERATE 35: CPT | Performed by: PHYSICIAN ASSISTANT

## 2019-10-02 PROCEDURE — 6370000000 HC RX 637 (ALT 250 FOR IP): Performed by: INTERNAL MEDICINE

## 2019-10-02 PROCEDURE — 2580000003 HC RX 258: Performed by: INTERNAL MEDICINE

## 2019-10-02 PROCEDURE — 6370000000 HC RX 637 (ALT 250 FOR IP): Performed by: PHYSICIAN ASSISTANT

## 2019-10-02 PROCEDURE — 94760 N-INVAS EAR/PLS OXIMETRY 1: CPT

## 2019-10-02 RX ADMIN — Medication 10 ML: at 20:03

## 2019-10-02 RX ADMIN — PHENOBARBITAL 32.4 MG: 32.4 TABLET ORAL at 20:03

## 2019-10-02 RX ADMIN — Medication 10 ML: at 09:28

## 2019-10-02 RX ADMIN — FOLIC ACID 1 MG: 1 TABLET ORAL at 09:28

## 2019-10-02 RX ADMIN — THERA TABS 1 TABLET: TAB at 09:28

## 2019-10-02 RX ADMIN — FAMOTIDINE 20 MG: 20 TABLET ORAL at 09:28

## 2019-10-02 RX ADMIN — FAMOTIDINE 20 MG: 20 TABLET ORAL at 20:03

## 2019-10-02 RX ADMIN — PHENOBARBITAL 64.8 MG: 32.4 TABLET ORAL at 09:29

## 2019-10-02 RX ADMIN — Medication 100 MG: at 09:28

## 2019-10-03 ENCOUNTER — TELEPHONE (OUTPATIENT)
Dept: FAMILY MEDICINE CLINIC | Age: 44
End: 2019-10-03

## 2019-10-03 VITALS
BODY MASS INDEX: 16.9 KG/M2 | HEIGHT: 71 IN | OXYGEN SATURATION: 97 % | TEMPERATURE: 98 F | RESPIRATION RATE: 16 BRPM | SYSTOLIC BLOOD PRESSURE: 118 MMHG | WEIGHT: 120.7 LBS | HEART RATE: 64 BPM | DIASTOLIC BLOOD PRESSURE: 56 MMHG

## 2019-10-03 PROCEDURE — 6370000000 HC RX 637 (ALT 250 FOR IP): Performed by: INTERNAL MEDICINE

## 2019-10-03 PROCEDURE — 6370000000 HC RX 637 (ALT 250 FOR IP): Performed by: PHYSICIAN ASSISTANT

## 2019-10-03 PROCEDURE — 2580000003 HC RX 258: Performed by: INTERNAL MEDICINE

## 2019-10-03 PROCEDURE — 99239 HOSP IP/OBS DSCHRG MGMT >30: CPT | Performed by: PHYSICIAN ASSISTANT

## 2019-10-03 RX ADMIN — FAMOTIDINE 20 MG: 20 TABLET ORAL at 08:15

## 2019-10-03 RX ADMIN — FOLIC ACID 1 MG: 1 TABLET ORAL at 08:15

## 2019-10-03 RX ADMIN — PHENOBARBITAL 32.4 MG: 32.4 TABLET ORAL at 08:15

## 2019-10-03 RX ADMIN — Medication 10 ML: at 08:15

## 2019-10-03 RX ADMIN — Medication 100 MG: at 08:15

## 2019-10-03 RX ADMIN — THERA TABS 1 TABLET: TAB at 08:15

## 2019-10-03 ASSESSMENT — PAIN SCALES - GENERAL
PAINLEVEL_OUTOF10: 0

## 2019-10-04 ENCOUNTER — TELEPHONE (OUTPATIENT)
Dept: FAMILY MEDICINE CLINIC | Age: 44
End: 2019-10-04

## 2019-10-09 ENCOUNTER — APPOINTMENT (OUTPATIENT)
Dept: CT IMAGING | Age: 44
End: 2019-10-09
Payer: MEDICARE

## 2019-10-09 ENCOUNTER — HOSPITAL ENCOUNTER (EMERGENCY)
Age: 44
Discharge: HOME | End: 2019-10-10
Payer: MEDICARE

## 2019-10-09 DIAGNOSIS — F10.920 ACUTE ALCOHOLIC INTOXICATION WITHOUT COMPLICATION (HCC): ICD-10-CM

## 2019-10-09 DIAGNOSIS — R42 DIZZINESS: Primary | ICD-10-CM

## 2019-10-09 LAB
ALBUMIN SERPL-MCNC: 4.4 G/DL (ref 3.5–5.1)
ALP BLD-CCNC: 91 U/L (ref 38–126)
ALT SERPL-CCNC: 31 U/L (ref 11–66)
ANION GAP SERPL CALCULATED.3IONS-SCNC: 16 MEQ/L (ref 8–16)
AST SERPL-CCNC: 41 U/L (ref 5–40)
BASOPHILS # BLD: 4.9 %
BASOPHILS ABSOLUTE: 0.2 THOU/MM3 (ref 0–0.1)
BILIRUB SERPL-MCNC: 0.5 MG/DL (ref 0.3–1.2)
BUN BLDV-MCNC: 7 MG/DL (ref 7–22)
CALCIUM SERPL-MCNC: 8.9 MG/DL (ref 8.5–10.5)
CHLORIDE BLD-SCNC: 101 MEQ/L (ref 98–111)
CO2: 30 MEQ/L (ref 23–33)
CREAT SERPL-MCNC: 0.5 MG/DL (ref 0.4–1.2)
EKG ATRIAL RATE: 72 BPM
EKG P AXIS: 81 DEGREES
EKG P-R INTERVAL: 156 MS
EKG Q-T INTERVAL: 394 MS
EKG QRS DURATION: 96 MS
EKG QTC CALCULATION (BAZETT): 431 MS
EKG R AXIS: 143 DEGREES
EKG T AXIS: 77 DEGREES
EKG VENTRICULAR RATE: 72 BPM
EOSINOPHIL # BLD: 2.1 %
EOSINOPHILS ABSOLUTE: 0.1 THOU/MM3 (ref 0–0.4)
ERYTHROCYTE [DISTWIDTH] IN BLOOD BY AUTOMATED COUNT: 14.7 % (ref 11.5–14.5)
ERYTHROCYTE [DISTWIDTH] IN BLOOD BY AUTOMATED COUNT: 56.4 FL (ref 35–45)
ETHYL ALCOHOL, SERUM: 0.31 %
GFR SERPL CREATININE-BSD FRML MDRD: > 90 ML/MIN/1.73M2
GLUCOSE BLD-MCNC: 108 MG/DL (ref 70–108)
HCT VFR BLD CALC: 46.2 % (ref 42–52)
HEMOGLOBIN: 15.5 GM/DL (ref 14–18)
IMMATURE GRANS (ABS): 0.02 THOU/MM3 (ref 0–0.07)
IMMATURE GRANULOCYTES: 0.5 %
LYMPHOCYTES # BLD: 52.4 %
LYMPHOCYTES ABSOLUTE: 2.3 THOU/MM3 (ref 1–4.8)
MAGNESIUM: 1.9 MG/DL (ref 1.6–2.4)
MCH RBC QN AUTO: 34.4 PG (ref 26–33)
MCHC RBC AUTO-ENTMCNC: 33.5 GM/DL (ref 32.2–35.5)
MCV RBC AUTO: 102.4 FL (ref 80–94)
MONOCYTES # BLD: 13.3 %
MONOCYTES ABSOLUTE: 0.6 THOU/MM3 (ref 0.4–1.3)
NUCLEATED RED BLOOD CELLS: 0 /100 WBC
OSMOLALITY CALCULATION: 290.9 MOSMOL/KG (ref 275–300)
PLATELET # BLD: 236 THOU/MM3 (ref 130–400)
PMV BLD AUTO: 8.9 FL (ref 9.4–12.4)
POTASSIUM SERPL-SCNC: 3.4 MEQ/L (ref 3.5–5.2)
RBC # BLD: 4.51 MILL/MM3 (ref 4.7–6.1)
SEG NEUTROPHILS: 26.8 %
SEGMENTED NEUTROPHILS ABSOLUTE COUNT: 1.2 THOU/MM3 (ref 1.8–7.7)
SODIUM BLD-SCNC: 147 MEQ/L (ref 135–145)
TOTAL PROTEIN: 7.1 G/DL (ref 6.1–8)
WBC # BLD: 4.3 THOU/MM3 (ref 4.8–10.8)

## 2019-10-09 PROCEDURE — 36415 COLL VENOUS BLD VENIPUNCTURE: CPT

## 2019-10-09 PROCEDURE — G0480 DRUG TEST DEF 1-7 CLASSES: HCPCS

## 2019-10-09 PROCEDURE — 96366 THER/PROPH/DIAG IV INF ADDON: CPT

## 2019-10-09 PROCEDURE — 99284 EMERGENCY DEPT VISIT MOD MDM: CPT

## 2019-10-09 PROCEDURE — 2500000003 HC RX 250 WO HCPCS: Performed by: EMERGENCY MEDICINE

## 2019-10-09 PROCEDURE — 80307 DRUG TEST PRSMV CHEM ANLYZR: CPT

## 2019-10-09 PROCEDURE — 85025 COMPLETE CBC W/AUTO DIFF WBC: CPT

## 2019-10-09 PROCEDURE — 96365 THER/PROPH/DIAG IV INF INIT: CPT

## 2019-10-09 PROCEDURE — 83735 ASSAY OF MAGNESIUM: CPT

## 2019-10-09 PROCEDURE — 6360000002 HC RX W HCPCS: Performed by: EMERGENCY MEDICINE

## 2019-10-09 PROCEDURE — 2580000003 HC RX 258: Performed by: EMERGENCY MEDICINE

## 2019-10-09 PROCEDURE — 70450 CT HEAD/BRAIN W/O DYE: CPT

## 2019-10-09 PROCEDURE — 80053 COMPREHEN METABOLIC PANEL: CPT

## 2019-10-09 PROCEDURE — 93005 ELECTROCARDIOGRAM TRACING: CPT | Performed by: EMERGENCY MEDICINE

## 2019-10-09 RX ADMIN — FOLIC ACID: 5 INJECTION, SOLUTION INTRAMUSCULAR; INTRAVENOUS; SUBCUTANEOUS at 21:07

## 2019-10-09 ASSESSMENT — ENCOUNTER SYMPTOMS
ABDOMINAL DISTENTION: 0
COLOR CHANGE: 0
NAUSEA: 0
BACK PAIN: 0
ABDOMINAL PAIN: 0
CHEST TIGHTNESS: 0
SHORTNESS OF BREATH: 0
SINUS PRESSURE: 0
VOMITING: 0
SINUS PAIN: 0
COUGH: 0

## 2019-10-09 ASSESSMENT — PAIN SCALES - GENERAL
PAINLEVEL_OUTOF10: 3
PAINLEVEL_OUTOF10: 3

## 2019-10-09 ASSESSMENT — PAIN DESCRIPTION - LOCATION: LOCATION: ABDOMEN

## 2019-10-10 VITALS
OXYGEN SATURATION: 94 % | RESPIRATION RATE: 17 BRPM | TEMPERATURE: 99.3 F | SYSTOLIC BLOOD PRESSURE: 118 MMHG | HEART RATE: 86 BPM | BODY MASS INDEX: 16.74 KG/M2 | DIASTOLIC BLOOD PRESSURE: 78 MMHG | WEIGHT: 120 LBS

## 2019-10-10 LAB
AMPHETAMINE+METHAMPHETAMINE URINE SCREEN: NEGATIVE
BARBITURATE QUANTITATIVE URINE: POSITIVE
BENZODIAZEPINE QUANTITATIVE URINE: NEGATIVE
CANNABINOID QUANTITATIVE URINE: NEGATIVE
COCAINE METABOLITE QUANTITATIVE URINE: NEGATIVE
ETHYL ALCOHOL, SERUM: 0.09 %
ETHYL ALCOHOL, SERUM: 0.14 %
OPIATES, URINE: NEGATIVE
OXYCODONE: NEGATIVE
PHENCYCLIDINE QUANTITATIVE URINE: NEGATIVE

## 2019-10-10 PROCEDURE — 6370000000 HC RX 637 (ALT 250 FOR IP): Performed by: PHYSICIAN ASSISTANT

## 2019-10-10 PROCEDURE — 6370000000 HC RX 637 (ALT 250 FOR IP): Performed by: NURSE PRACTITIONER

## 2019-10-10 PROCEDURE — 96366 THER/PROPH/DIAG IV INF ADDON: CPT

## 2019-10-10 PROCEDURE — G0480 DRUG TEST DEF 1-7 CLASSES: HCPCS

## 2019-10-10 PROCEDURE — 93010 ELECTROCARDIOGRAM REPORT: CPT | Performed by: NUCLEAR MEDICINE

## 2019-10-10 PROCEDURE — 36415 COLL VENOUS BLD VENIPUNCTURE: CPT

## 2019-10-10 RX ORDER — LORAZEPAM 1 MG/1
1 TABLET ORAL ONCE
Status: COMPLETED | OUTPATIENT
Start: 2019-10-10 | End: 2019-10-10

## 2019-10-10 RX ORDER — LOPERAMIDE HYDROCHLORIDE 2 MG/1
2 CAPSULE ORAL ONCE
Status: COMPLETED | OUTPATIENT
Start: 2019-10-10 | End: 2019-10-10

## 2019-10-10 RX ADMIN — LORAZEPAM 1 MG: 1 TABLET ORAL at 10:02

## 2019-10-10 RX ADMIN — LOPERAMIDE HYDROCHLORIDE 2 MG: 2 CAPSULE ORAL at 04:29

## 2019-10-10 ASSESSMENT — PAIN SCALES - GENERAL: PAINLEVEL_OUTOF10: 3

## 2019-10-10 ASSESSMENT — PAIN DESCRIPTION - LOCATION: LOCATION: ABDOMEN

## 2022-12-28 NOTE — ED TRIAGE NOTES
Face sheet and clinical note forwarded to [employer] for pneumatic compression.       Pt comes to ED via EMS with c/o dizziness. Pt states that he had three caffinated large alcoholic beverages today. Pt states he feels dizzy and delirious. Pt states this dizziness has been going for about a week. Pt also states his dizziness feels like disorientation when he walks. Pt denies SI/HI. Pt states he cannot tell if the disorientation comes from drinking or not. Pt states he is not having normal bowel movement. Pt states his home life and relationship are good but seems to get upset when talking about it. Pt is alert.

## 2025-01-10 NOTE — TELEPHONE ENCOUNTER
.Transition of Care visit scheduled.   9/3/2019  Patient is being discharged to Home  Date of discharge 8/29/19  Discharge from facility [facility]  Reason for admission Alcohol Detox [Negative] : Musculoskeletal [de-identified] : fatigue